# Patient Record
Sex: FEMALE | Race: WHITE | NOT HISPANIC OR LATINO | URBAN - METROPOLITAN AREA
[De-identification: names, ages, dates, MRNs, and addresses within clinical notes are randomized per-mention and may not be internally consistent; named-entity substitution may affect disease eponyms.]

---

## 2019-11-21 ENCOUNTER — OUTPATIENT (OUTPATIENT)
Dept: OUTPATIENT SERVICES | Facility: HOSPITAL | Age: 79
LOS: 1 days | Discharge: PSYCHIATRIC OR SUBSTANCE USE FACILITY | End: 2019-11-21
Payer: COMMERCIAL

## 2019-11-21 ENCOUNTER — EMERGENCY (EMERGENCY)
Facility: HOSPITAL | Age: 79
LOS: 1 days | Discharge: HOME | End: 2019-11-21
Attending: EMERGENCY MEDICINE | Admitting: EMERGENCY MEDICINE
Payer: COMMERCIAL

## 2019-11-21 VITALS
TEMPERATURE: 98 F | RESPIRATION RATE: 18 BRPM | OXYGEN SATURATION: 95 % | DIASTOLIC BLOOD PRESSURE: 77 MMHG | SYSTOLIC BLOOD PRESSURE: 167 MMHG | HEART RATE: 75 BPM

## 2019-11-21 VITALS
HEART RATE: 73 BPM | OXYGEN SATURATION: 100 % | DIASTOLIC BLOOD PRESSURE: 101 MMHG | TEMPERATURE: 98 F | SYSTOLIC BLOOD PRESSURE: 174 MMHG | RESPIRATION RATE: 16 BRPM

## 2019-11-21 PROCEDURE — 99283 EMERGENCY DEPT VISIT LOW MDM: CPT

## 2019-11-21 NOTE — ED PROVIDER NOTE - CLINICAL SUMMARY MEDICAL DECISION MAKING FREE TEXT BOX
Depression and anxiety.  no indication for inpatient psych hospitalization.  will refer pt to [facility] where she can walk in and be seen today.

## 2019-11-21 NOTE — ED ADULT NURSE NOTE - CHIEF COMPLAINT QUOTE
Pt c/o increasing depression , shaking and anxiety   x 1 week.   Denies SI, HI, hallucinations.  Pt was recently treated for PNA ,  c/o weakness , dizziness poor appetite at triage

## 2019-11-21 NOTE — ED ADULT TRIAGE NOTE - CHIEF COMPLAINT QUOTE
Pt c/o increasing depression , shaking and anxiety   x 1 week.   Denies SI, HI, hallucinations Pt c/o increasing depression , shaking and anxiety   x 1 week.   Denies SI, HI, hallucinations.  Pt was recently treated for PNA ,  c/o weakness , dizziness poor appetite at triage

## 2019-11-21 NOTE — ED PROVIDER NOTE - PROGRESS NOTE DETAILS
Claudine Ricardo MD: spoke to pt about going to the crisis center. pt is ok with going right now Claudine Ricardo MD: spoke to pt about going to the crisis center. pt is ok with going right now  Elaina:  discussed case with psych attending . this is an appropriate referral to the walk in crisis center at [facility] where they can write prescriptions for pt's

## 2019-11-21 NOTE — ED ADULT NURSE NOTE - NSIMPLEMENTINTERV_GEN_ALL_ED
Implemented All Universal Safety Interventions:  [place] to call system. Call bell, personal items and telephone within reach. Instruct patient to call for assistance. Room bathroom lighting operational. Non-slip footwear when patient is off stretcher. Physically safe environment: no spills, clutter or unnecessary equipment. Stretcher in lowest position, wheels locked, appropriate side rails in place.

## 2019-11-21 NOTE — ED PROVIDER NOTE - NSFOLLOWUPINSTRUCTIONS_ED_ALL_ED_FT
1) Please follow-up with your primary care doctor in the next 2-3 days.  Please call tomorrow for an appointment.  If you cannot follow-up with your primary care doctor please return to the ED for any urgent issues.  2) You were given a copy of the tests performed today.  Please bring the results with you and review them with your primary care doctor.  3) If you have any worsening of symptoms or any other concerns please return to the ED immediately. Such as but not limited to suicidal or homicidal thoughts or plans  4) Please continue taking your home medications as directed.

## 2019-11-21 NOTE — ED PROVIDER NOTE - OBJECTIVE STATEMENT
Attendinyo female presents with family for anxiety and depression.  pt states she was recently hospitalized for pneumonia and does not have any residual symptoms from that.  states she feels anxious.  when she was in the hospital for about 5 days, she was receiving xanax for anxiety, and she would like an Rx for that.  she made an appointment with a psychiatrist but it is not for a month.  pt denies suicidal ideation.  states she just wants a prescription for something to calm her nerves.

## 2019-11-21 NOTE — ED PROVIDER NOTE - PATIENT PORTAL LINK FT
You can access the FollowMyHealth Patient Portal offered by [facility] by registering at the following website: [email or web address]. By joining [facility]’s FollowMyHealth portal, you will also be able to view your health information using other applications (apps) compatible with our system.

## 2019-11-21 NOTE — ED ADULT NURSE NOTE - OBJECTIVE STATEMENT
Pt is a 78yr old female, A&Ox4 and ambulatory, complaining of anxiety, shakiness, and weakness the past few months. Pt was discharged from [facility] on Tuesday and was treated for pneumonia. As per brother, pt was given Xanex in the hospital for the anxiety and was "doing well on it". Pt reports never been diagnosed with anxiety or depression. Pt lives alone. Pt expresses her stressors are old age, financial status, and having her family live far away. Pt appears anxious, shaking hands noted. Pt reports having loss of appetite, feeling fatigued, and hopeless. Denies SI/HI and any type of hallucinations. Denies chest pain, SOB, headache, dizziness, and fever/chills. VS as noted. Will continue to monitor.

## 2019-11-22 DIAGNOSIS — F41.9 ANXIETY DISORDER, UNSPECIFIED: ICD-10-CM

## 2019-12-03 ENCOUNTER — INPATIENT (INPATIENT)
Facility: HOSPITAL | Age: 79
LOS: 89 days | Discharge: HOME | End: 2020-03-02
Attending: PSYCHIATRY & NEUROLOGY | Admitting: PSYCHIATRY & NEUROLOGY
Payer: COMMERCIAL

## 2019-12-03 VITALS — TEMPERATURE: 98 F | HEIGHT: 63 IN | WEIGHT: 134.04 LBS

## 2019-12-03 DIAGNOSIS — F41.9 ANXIETY DISORDER, UNSPECIFIED: ICD-10-CM

## 2019-12-03 PROBLEM — I10 ESSENTIAL (PRIMARY) HYPERTENSION: Chronic | Status: ACTIVE | Noted: 2019-11-23

## 2019-12-03 RX ORDER — SERTRALINE 25 MG/1
25 TABLET, FILM COATED ORAL DAILY
Refills: 0 | Status: DISCONTINUED | OUTPATIENT
Start: 2019-12-03 | End: 2019-12-06

## 2019-12-03 RX ORDER — ATORVASTATIN CALCIUM 80 MG/1
20 TABLET, FILM COATED ORAL AT BEDTIME
Refills: 0 | Status: DISCONTINUED | OUTPATIENT
Start: 2019-12-03 | End: 2020-03-02

## 2019-12-03 RX ORDER — METOPROLOL TARTRATE 50 MG
50 TABLET ORAL DAILY
Refills: 0 | Status: DISCONTINUED | OUTPATIENT
Start: 2019-12-03 | End: 2020-02-18

## 2019-12-03 RX ORDER — LISINOPRIL 2.5 MG/1
2.5 TABLET ORAL DAILY
Refills: 0 | Status: DISCONTINUED | OUTPATIENT
Start: 2019-12-03 | End: 2020-02-18

## 2019-12-03 RX ORDER — PANTOPRAZOLE SODIUM 20 MG/1
40 TABLET, DELAYED RELEASE ORAL
Refills: 0 | Status: DISCONTINUED | OUTPATIENT
Start: 2019-12-03 | End: 2019-12-05

## 2019-12-03 RX ORDER — LANOLIN ALCOHOL/MO/W.PET/CERES
3 CREAM (GRAM) TOPICAL AT BEDTIME
Refills: 0 | Status: DISCONTINUED | OUTPATIENT
Start: 2019-12-03 | End: 2020-03-02

## 2019-12-03 RX ADMIN — Medication 3 MILLIGRAM(S): at 20:51

## 2019-12-03 RX ADMIN — ATORVASTATIN CALCIUM 20 MILLIGRAM(S): 80 TABLET, FILM COATED ORAL at 20:51

## 2019-12-03 RX ADMIN — Medication 0.5 MILLIGRAM(S): at 20:51

## 2019-12-03 NOTE — CHART NOTE - NSCHARTNOTEFT_GEN_A_CORE
Screening Medical Evaluation  Patient Admitted from: Crisis Center    [facility] admitting diagnosis: Anxiety disorder    PAST MEDICAL & SURGICAL HISTORY:  HTN (hypertension)        Allergies    No Known Allergies    Intolerances        Social History:     FAMILY HISTORY:      MEDICATIONS  (STANDING):  atorvastatin 20 milliGRAM(s) Oral at bedtime  lisinopril 2.5 milliGRAM(s) Oral daily  LORazepam     Tablet 0.5 milliGRAM(s) Oral two times a day  melatonin. 3 milliGRAM(s) Oral at bedtime  metoprolol succinate ER 50 milliGRAM(s) Oral daily  pantoprazole    Tablet 40 milliGRAM(s) Oral before breakfast  PARoxetine 10 milliGRAM(s) Oral daily  sertraline 25 milliGRAM(s) Oral daily    MEDICATIONS  (PRN):  LORazepam     Tablet 0.5 milliGRAM(s) Oral every 6 hours PRN Anxiety      Vital Signs Last 24 Hrs  T(C): 36.4 (03 Dec 2019 16:19), Max: 36.4 (03 Dec 2019 16:19)  T(F): 97.5 (03 Dec 2019 16:19), Max: 97.5 (03 Dec 2019 16:19)  HR: 112  BP: 133/75  RR: 18  SpO2: 99  CAPILLARY BLOOD GLUCOSE            PHYSICAL EXAM:  GENERAL: NAD, well-developed  HEAD:  Atraumatic, Normocephalic  EYES: EOMI, PERRLA, conjunctiva and sclera clear  NECK: Supple.  CHEST/LUNG: Clear to auscultation bilaterally; No wheeze  HEART: Regular rate and rhythm; No murmurs, rubs, or gallops  ABDOMEN: Soft, Nontender, Nondistended; Bowel sounds present  EXTREMITIES:  2+ Peripheral Pulses, No cyanosis, or edema  PSYCH: AAOx3  NEUROLOGY: non-focal  SKIN: No rashes or lesions    LABS:                    RADIOLOGY & ADDITIONAL TESTS:    Assessment and Plan:  79 year old female presenting today from Crisis Center to [facility] with admitting diagnosis of Anxiety disorder and PMH of HTN, HLD, GERD. Denies any medical concerns at this time. Denies any fever, chills, headache, chest pain, SOB, abdominal pain, N/V/D/C, dysuria. Physical exam unremarkable.  1) Anxiety disorder: follow care plan as per primary team.   2) HTN: Continue Lisinopril 2.5mg oral daily, metoprolol 50mg oral daily  3) GERD: Continue Protonix DR 40mg oral before breakfast.  4) HLD: Continue Lipitor 20mg oral at bedtime.   5) Insomnia: Continue melatonin 3mg prn.

## 2019-12-04 LAB
ALBUMIN SERPL ELPH-MCNC: 3.4 G/DL — SIGNIFICANT CHANGE UP (ref 3.3–5)
ALP SERPL-CCNC: 60 U/L — SIGNIFICANT CHANGE UP (ref 40–120)
ALT FLD-CCNC: 11 U/L — SIGNIFICANT CHANGE UP (ref 4–33)
ANION GAP SERPL CALC-SCNC: 9 MMO/L — SIGNIFICANT CHANGE UP (ref 7–14)
AST SERPL-CCNC: 17 U/L — SIGNIFICANT CHANGE UP (ref 4–32)
BASOPHILS # BLD AUTO: 0.05 K/UL — SIGNIFICANT CHANGE UP (ref 0–0.2)
BASOPHILS NFR BLD AUTO: 1.2 % — SIGNIFICANT CHANGE UP (ref 0–2)
BILIRUB SERPL-MCNC: 0.5 MG/DL — SIGNIFICANT CHANGE UP (ref 0.2–1.2)
BUN SERPL-MCNC: 15 MG/DL — SIGNIFICANT CHANGE UP (ref 7–23)
CALCIUM SERPL-MCNC: 9.8 MG/DL — SIGNIFICANT CHANGE UP (ref 8.4–10.5)
CHLORIDE SERPL-SCNC: 104 MMOL/L — SIGNIFICANT CHANGE UP (ref 98–107)
CHOLEST SERPL-MCNC: 152 MG/DL — SIGNIFICANT CHANGE UP (ref 120–199)
CO2 SERPL-SCNC: 27 MMOL/L — SIGNIFICANT CHANGE UP (ref 22–31)
CREAT SERPL-MCNC: 0.73 MG/DL — SIGNIFICANT CHANGE UP (ref 0.5–1.3)
EOSINOPHIL # BLD AUTO: 0.17 K/UL — SIGNIFICANT CHANGE UP (ref 0–0.5)
EOSINOPHIL NFR BLD AUTO: 4 % — SIGNIFICANT CHANGE UP (ref 0–6)
GLUCOSE SERPL-MCNC: 95 MG/DL — SIGNIFICANT CHANGE UP (ref 70–99)
HCT VFR BLD CALC: 40.3 % — SIGNIFICANT CHANGE UP (ref 34.5–45)
HDLC SERPL-MCNC: 51 MG/DL — SIGNIFICANT CHANGE UP (ref 45–65)
HGB BLD-MCNC: 13.2 G/DL — SIGNIFICANT CHANGE UP (ref 11.5–15.5)
IMM GRANULOCYTES NFR BLD AUTO: 0.2 % — SIGNIFICANT CHANGE UP (ref 0–1.5)
LIPID PNL WITH DIRECT LDL SERPL: 87 MG/DL — SIGNIFICANT CHANGE UP
LYMPHOCYTES # BLD AUTO: 1.3 K/UL — SIGNIFICANT CHANGE UP (ref 1–3.3)
LYMPHOCYTES # BLD AUTO: 30.7 % — SIGNIFICANT CHANGE UP (ref 13–44)
MCHC RBC-ENTMCNC: 30.3 PG — SIGNIFICANT CHANGE UP (ref 27–34)
MCHC RBC-ENTMCNC: 32.8 % — SIGNIFICANT CHANGE UP (ref 32–36)
MCV RBC AUTO: 92.6 FL — SIGNIFICANT CHANGE UP (ref 80–100)
MONOCYTES # BLD AUTO: 0.49 K/UL — SIGNIFICANT CHANGE UP (ref 0–0.9)
MONOCYTES NFR BLD AUTO: 11.6 % — SIGNIFICANT CHANGE UP (ref 2–14)
NEUTROPHILS # BLD AUTO: 2.21 K/UL — SIGNIFICANT CHANGE UP (ref 1.8–7.4)
NEUTROPHILS NFR BLD AUTO: 52.3 % — SIGNIFICANT CHANGE UP (ref 43–77)
NRBC # FLD: 0 K/UL — SIGNIFICANT CHANGE UP (ref 0–0)
PLATELET # BLD AUTO: 296 K/UL — SIGNIFICANT CHANGE UP (ref 150–400)
PMV BLD: 8.9 FL — SIGNIFICANT CHANGE UP (ref 7–13)
POTASSIUM SERPL-MCNC: 4 MMOL/L — SIGNIFICANT CHANGE UP (ref 3.5–5.3)
POTASSIUM SERPL-SCNC: 4 MMOL/L — SIGNIFICANT CHANGE UP (ref 3.5–5.3)
PROT SERPL-MCNC: 6.3 G/DL — SIGNIFICANT CHANGE UP (ref 6–8.3)
RBC # BLD: 4.35 M/UL — SIGNIFICANT CHANGE UP (ref 3.8–5.2)
RBC # FLD: 13.3 % — SIGNIFICANT CHANGE UP (ref 10.3–14.5)
SODIUM SERPL-SCNC: 140 MMOL/L — SIGNIFICANT CHANGE UP (ref 135–145)
TRIGL SERPL-MCNC: 94 MG/DL — SIGNIFICANT CHANGE UP (ref 10–149)
TSH SERPL-MCNC: 1.01 UIU/ML — SIGNIFICANT CHANGE UP (ref 0.27–4.2)
WBC # BLD: 4.23 K/UL — SIGNIFICANT CHANGE UP (ref 3.8–10.5)
WBC # FLD AUTO: 4.23 K/UL — SIGNIFICANT CHANGE UP (ref 3.8–10.5)

## 2019-12-04 PROCEDURE — 99232 SBSQ HOSP IP/OBS MODERATE 35: CPT | Mod: GC

## 2019-12-04 PROCEDURE — 90853 GROUP PSYCHOTHERAPY: CPT

## 2019-12-04 RX ADMIN — Medication 3 MILLIGRAM(S): at 20:55

## 2019-12-04 RX ADMIN — Medication 0.5 MILLIGRAM(S): at 08:27

## 2019-12-04 RX ADMIN — PANTOPRAZOLE SODIUM 40 MILLIGRAM(S): 20 TABLET, DELAYED RELEASE ORAL at 08:27

## 2019-12-04 RX ADMIN — Medication 0.5 MILLIGRAM(S): at 20:55

## 2019-12-04 RX ADMIN — ATORVASTATIN CALCIUM 20 MILLIGRAM(S): 80 TABLET, FILM COATED ORAL at 20:55

## 2019-12-04 RX ADMIN — Medication 10 MILLIGRAM(S): at 08:27

## 2019-12-04 RX ADMIN — SERTRALINE 25 MILLIGRAM(S): 25 TABLET, FILM COATED ORAL at 08:27

## 2019-12-05 PROCEDURE — 99232 SBSQ HOSP IP/OBS MODERATE 35: CPT | Mod: GC

## 2019-12-05 RX ORDER — ACETAMINOPHEN 500 MG
500 TABLET ORAL EVERY 6 HOURS
Refills: 0 | Status: DISCONTINUED | OUTPATIENT
Start: 2019-12-05 | End: 2019-12-05

## 2019-12-05 RX ORDER — ACETAMINOPHEN 500 MG
650 TABLET ORAL EVERY 6 HOURS
Refills: 0 | Status: DISCONTINUED | OUTPATIENT
Start: 2019-12-05 | End: 2020-03-02

## 2019-12-05 RX ORDER — ESCITALOPRAM OXALATE 10 MG/1
5 TABLET, FILM COATED ORAL DAILY
Refills: 0 | Status: DISCONTINUED | OUTPATIENT
Start: 2019-12-05 | End: 2019-12-05

## 2019-12-05 RX ADMIN — PANTOPRAZOLE SODIUM 40 MILLIGRAM(S): 20 TABLET, DELAYED RELEASE ORAL at 07:57

## 2019-12-05 RX ADMIN — Medication 0.5 MILLIGRAM(S): at 20:20

## 2019-12-05 RX ADMIN — Medication 10 MILLIGRAM(S): at 09:30

## 2019-12-05 RX ADMIN — Medication 3 MILLIGRAM(S): at 20:32

## 2019-12-05 RX ADMIN — ATORVASTATIN CALCIUM 20 MILLIGRAM(S): 80 TABLET, FILM COATED ORAL at 20:32

## 2019-12-05 RX ADMIN — Medication 0.5 MILLIGRAM(S): at 09:30

## 2019-12-05 RX ADMIN — Medication 50 MILLIGRAM(S): at 09:30

## 2019-12-05 RX ADMIN — SERTRALINE 25 MILLIGRAM(S): 25 TABLET, FILM COATED ORAL at 09:30

## 2019-12-05 RX ADMIN — Medication 0.5 MILLIGRAM(S): at 12:55

## 2019-12-05 RX ADMIN — LISINOPRIL 2.5 MILLIGRAM(S): 2.5 TABLET ORAL at 09:30

## 2019-12-05 RX ADMIN — Medication 650 MILLIGRAM(S): at 10:40

## 2019-12-05 RX ADMIN — Medication 650 MILLIGRAM(S): at 09:40

## 2019-12-05 NOTE — PHARMACOTHERAPY INTERVENTION NOTE - COMMENTS
Spoke to Dr. Prather recommending to change Acetaminophen 500 mG to 325 mG (2) tablets q6h prn.  Md accepted recommendation.

## 2019-12-06 PROCEDURE — 99232 SBSQ HOSP IP/OBS MODERATE 35: CPT

## 2019-12-06 RX ORDER — SERTRALINE 25 MG/1
50 TABLET, FILM COATED ORAL DAILY
Refills: 0 | Status: DISCONTINUED | OUTPATIENT
Start: 2019-12-06 | End: 2019-12-08

## 2019-12-06 RX ADMIN — LISINOPRIL 2.5 MILLIGRAM(S): 2.5 TABLET ORAL at 09:32

## 2019-12-06 RX ADMIN — Medication 0.5 MILLIGRAM(S): at 09:32

## 2019-12-06 RX ADMIN — Medication 0.5 MILLIGRAM(S): at 13:33

## 2019-12-06 RX ADMIN — SERTRALINE 50 MILLIGRAM(S): 25 TABLET, FILM COATED ORAL at 09:32

## 2019-12-06 RX ADMIN — Medication 0.5 MILLIGRAM(S): at 21:07

## 2019-12-06 RX ADMIN — Medication 50 MILLIGRAM(S): at 09:32

## 2019-12-06 RX ADMIN — Medication 3 MILLIGRAM(S): at 21:07

## 2019-12-06 RX ADMIN — Medication 1 TABLET(S): at 09:32

## 2019-12-06 RX ADMIN — ATORVASTATIN CALCIUM 20 MILLIGRAM(S): 80 TABLET, FILM COATED ORAL at 21:07

## 2019-12-06 RX ADMIN — Medication 650 MILLIGRAM(S): at 18:15

## 2019-12-06 RX ADMIN — Medication 650 MILLIGRAM(S): at 17:30

## 2019-12-07 PROCEDURE — 99231 SBSQ HOSP IP/OBS SF/LOW 25: CPT

## 2019-12-07 RX ORDER — POLYETHYLENE GLYCOL 3350 17 G/17G
17 POWDER, FOR SOLUTION ORAL DAILY
Refills: 0 | Status: DISCONTINUED | OUTPATIENT
Start: 2019-12-07 | End: 2019-12-10

## 2019-12-07 RX ADMIN — Medication 0.5 MILLIGRAM(S): at 11:43

## 2019-12-07 RX ADMIN — SERTRALINE 50 MILLIGRAM(S): 25 TABLET, FILM COATED ORAL at 09:06

## 2019-12-07 RX ADMIN — Medication 1 TABLET(S): at 09:06

## 2019-12-07 RX ADMIN — Medication 650 MILLIGRAM(S): at 08:43

## 2019-12-07 RX ADMIN — Medication 50 MILLIGRAM(S): at 09:06

## 2019-12-07 RX ADMIN — Medication 0.5 MILLIGRAM(S): at 13:42

## 2019-12-07 RX ADMIN — Medication 3 MILLIGRAM(S): at 21:24

## 2019-12-07 RX ADMIN — Medication 0.5 MILLIGRAM(S): at 08:55

## 2019-12-07 RX ADMIN — LISINOPRIL 2.5 MILLIGRAM(S): 2.5 TABLET ORAL at 09:06

## 2019-12-07 RX ADMIN — ATORVASTATIN CALCIUM 20 MILLIGRAM(S): 80 TABLET, FILM COATED ORAL at 21:24

## 2019-12-07 RX ADMIN — Medication 650 MILLIGRAM(S): at 07:46

## 2019-12-07 RX ADMIN — POLYETHYLENE GLYCOL 3350 17 GRAM(S): 17 POWDER, FOR SOLUTION ORAL at 09:06

## 2019-12-07 RX ADMIN — Medication 0.5 MILLIGRAM(S): at 21:24

## 2019-12-08 PROCEDURE — 99231 SBSQ HOSP IP/OBS SF/LOW 25: CPT

## 2019-12-08 RX ORDER — SENNA PLUS 8.6 MG/1
2 TABLET ORAL AT BEDTIME
Refills: 0 | Status: DISCONTINUED | OUTPATIENT
Start: 2019-12-08 | End: 2019-12-10

## 2019-12-08 RX ORDER — SERTRALINE 25 MG/1
100 TABLET, FILM COATED ORAL DAILY
Refills: 0 | Status: DISCONTINUED | OUTPATIENT
Start: 2019-12-08 | End: 2020-01-06

## 2019-12-08 RX ADMIN — Medication 3 MILLIGRAM(S): at 21:15

## 2019-12-08 RX ADMIN — Medication 0.5 MILLIGRAM(S): at 21:13

## 2019-12-08 RX ADMIN — SENNA PLUS 2 TABLET(S): 8.6 TABLET ORAL at 21:15

## 2019-12-08 RX ADMIN — Medication 0.5 MILLIGRAM(S): at 09:56

## 2019-12-08 RX ADMIN — Medication 650 MILLIGRAM(S): at 10:28

## 2019-12-08 RX ADMIN — ATORVASTATIN CALCIUM 20 MILLIGRAM(S): 80 TABLET, FILM COATED ORAL at 21:15

## 2019-12-08 RX ADMIN — POLYETHYLENE GLYCOL 3350 17 GRAM(S): 17 POWDER, FOR SOLUTION ORAL at 09:56

## 2019-12-08 RX ADMIN — Medication 0.5 MILLIGRAM(S): at 12:48

## 2019-12-08 RX ADMIN — SERTRALINE 100 MILLIGRAM(S): 25 TABLET, FILM COATED ORAL at 09:56

## 2019-12-08 RX ADMIN — Medication 1 TABLET(S): at 09:56

## 2019-12-08 RX ADMIN — Medication 650 MILLIGRAM(S): at 11:30

## 2019-12-09 PROCEDURE — 99232 SBSQ HOSP IP/OBS MODERATE 35: CPT | Mod: GC

## 2019-12-09 RX ADMIN — Medication 50 MILLIGRAM(S): at 08:40

## 2019-12-09 RX ADMIN — Medication 3 MILLIGRAM(S): at 20:51

## 2019-12-09 RX ADMIN — Medication 1 ENEMA: at 12:36

## 2019-12-09 RX ADMIN — ATORVASTATIN CALCIUM 20 MILLIGRAM(S): 80 TABLET, FILM COATED ORAL at 20:51

## 2019-12-09 RX ADMIN — Medication 0.5 MILLIGRAM(S): at 08:40

## 2019-12-09 RX ADMIN — Medication 0.5 MILLIGRAM(S): at 20:50

## 2019-12-09 RX ADMIN — SERTRALINE 100 MILLIGRAM(S): 25 TABLET, FILM COATED ORAL at 08:40

## 2019-12-09 RX ADMIN — POLYETHYLENE GLYCOL 3350 17 GRAM(S): 17 POWDER, FOR SOLUTION ORAL at 08:40

## 2019-12-09 RX ADMIN — LISINOPRIL 2.5 MILLIGRAM(S): 2.5 TABLET ORAL at 08:40

## 2019-12-09 RX ADMIN — Medication 0.5 MILLIGRAM(S): at 12:43

## 2019-12-09 RX ADMIN — SENNA PLUS 2 TABLET(S): 8.6 TABLET ORAL at 20:51

## 2019-12-09 RX ADMIN — Medication 1 TABLET(S): at 08:40

## 2019-12-10 LAB
FOLATE SERPL-MCNC: > 20 NG/ML — HIGH (ref 4.7–20)
HBA1C BLD-MCNC: 5.4 % — SIGNIFICANT CHANGE UP (ref 4–5.6)
VIT B12 SERPL-MCNC: 640 PG/ML — SIGNIFICANT CHANGE UP (ref 200–900)

## 2019-12-10 PROCEDURE — 99232 SBSQ HOSP IP/OBS MODERATE 35: CPT | Mod: GC

## 2019-12-10 RX ORDER — SENNA PLUS 8.6 MG/1
2 TABLET ORAL AT BEDTIME
Refills: 0 | Status: DISCONTINUED | OUTPATIENT
Start: 2019-12-10 | End: 2020-03-02

## 2019-12-10 RX ORDER — POLYETHYLENE GLYCOL 3350 17 G/17G
17 POWDER, FOR SOLUTION ORAL DAILY
Refills: 0 | Status: DISCONTINUED | OUTPATIENT
Start: 2019-12-10 | End: 2020-03-02

## 2019-12-10 RX ADMIN — Medication 0.5 MILLIGRAM(S): at 15:30

## 2019-12-10 RX ADMIN — ATORVASTATIN CALCIUM 20 MILLIGRAM(S): 80 TABLET, FILM COATED ORAL at 20:13

## 2019-12-10 RX ADMIN — Medication 650 MILLIGRAM(S): at 20:12

## 2019-12-10 RX ADMIN — Medication 50 MILLIGRAM(S): at 08:22

## 2019-12-10 RX ADMIN — LISINOPRIL 2.5 MILLIGRAM(S): 2.5 TABLET ORAL at 08:22

## 2019-12-10 RX ADMIN — Medication 0.5 MILLIGRAM(S): at 20:12

## 2019-12-10 RX ADMIN — Medication 1 TABLET(S): at 08:22

## 2019-12-10 RX ADMIN — SERTRALINE 100 MILLIGRAM(S): 25 TABLET, FILM COATED ORAL at 08:21

## 2019-12-10 RX ADMIN — Medication 3 MILLIGRAM(S): at 20:13

## 2019-12-10 RX ADMIN — Medication 650 MILLIGRAM(S): at 21:38

## 2019-12-10 RX ADMIN — Medication 0.5 MILLIGRAM(S): at 08:22

## 2019-12-11 PROCEDURE — 99232 SBSQ HOSP IP/OBS MODERATE 35: CPT | Mod: GC

## 2019-12-11 RX ORDER — VENLAFAXINE HCL 75 MG
37.5 CAPSULE, EXT RELEASE 24 HR ORAL DAILY
Refills: 0 | Status: DISCONTINUED | OUTPATIENT
Start: 2019-12-12 | End: 2019-12-14

## 2019-12-11 RX ADMIN — LISINOPRIL 2.5 MILLIGRAM(S): 2.5 TABLET ORAL at 08:00

## 2019-12-11 RX ADMIN — Medication 1 TABLET(S): at 08:00

## 2019-12-11 RX ADMIN — Medication 0.5 MILLIGRAM(S): at 21:09

## 2019-12-11 RX ADMIN — Medication 0.5 MILLIGRAM(S): at 08:00

## 2019-12-11 RX ADMIN — SERTRALINE 100 MILLIGRAM(S): 25 TABLET, FILM COATED ORAL at 08:00

## 2019-12-11 RX ADMIN — Medication 3 MILLIGRAM(S): at 21:09

## 2019-12-11 RX ADMIN — Medication 50 MILLIGRAM(S): at 08:00

## 2019-12-11 RX ADMIN — ATORVASTATIN CALCIUM 20 MILLIGRAM(S): 80 TABLET, FILM COATED ORAL at 21:09

## 2019-12-12 PROCEDURE — 99232 SBSQ HOSP IP/OBS MODERATE 35: CPT | Mod: GC

## 2019-12-12 RX ADMIN — LISINOPRIL 2.5 MILLIGRAM(S): 2.5 TABLET ORAL at 09:08

## 2019-12-12 RX ADMIN — Medication 0.5 MILLIGRAM(S): at 14:40

## 2019-12-12 RX ADMIN — Medication 50 MILLIGRAM(S): at 09:08

## 2019-12-12 RX ADMIN — Medication 1 TABLET(S): at 09:08

## 2019-12-12 RX ADMIN — ATORVASTATIN CALCIUM 20 MILLIGRAM(S): 80 TABLET, FILM COATED ORAL at 20:47

## 2019-12-12 RX ADMIN — Medication 37.5 MILLIGRAM(S): at 09:08

## 2019-12-12 RX ADMIN — Medication 0.5 MILLIGRAM(S): at 20:47

## 2019-12-12 RX ADMIN — Medication 3 MILLIGRAM(S): at 20:47

## 2019-12-12 RX ADMIN — Medication 0.5 MILLIGRAM(S): at 09:08

## 2019-12-12 RX ADMIN — SERTRALINE 100 MILLIGRAM(S): 25 TABLET, FILM COATED ORAL at 09:08

## 2019-12-13 PROCEDURE — 99232 SBSQ HOSP IP/OBS MODERATE 35: CPT | Mod: GC

## 2019-12-13 RX ADMIN — Medication 3 MILLIGRAM(S): at 20:22

## 2019-12-13 RX ADMIN — ATORVASTATIN CALCIUM 20 MILLIGRAM(S): 80 TABLET, FILM COATED ORAL at 20:22

## 2019-12-13 RX ADMIN — Medication 37.5 MILLIGRAM(S): at 09:20

## 2019-12-13 RX ADMIN — Medication 1 TABLET(S): at 09:20

## 2019-12-13 RX ADMIN — SERTRALINE 100 MILLIGRAM(S): 25 TABLET, FILM COATED ORAL at 09:20

## 2019-12-13 RX ADMIN — Medication 0.5 MILLIGRAM(S): at 20:22

## 2019-12-13 RX ADMIN — LISINOPRIL 2.5 MILLIGRAM(S): 2.5 TABLET ORAL at 09:20

## 2019-12-13 RX ADMIN — Medication 0.5 MILLIGRAM(S): at 09:20

## 2019-12-13 RX ADMIN — Medication 0.5 MILLIGRAM(S): at 16:03

## 2019-12-13 RX ADMIN — Medication 50 MILLIGRAM(S): at 09:20

## 2019-12-14 PROCEDURE — 99231 SBSQ HOSP IP/OBS SF/LOW 25: CPT

## 2019-12-14 RX ORDER — VENLAFAXINE HCL 75 MG
75 CAPSULE, EXT RELEASE 24 HR ORAL DAILY
Refills: 0 | Status: DISCONTINUED | OUTPATIENT
Start: 2019-12-15 | End: 2019-12-18

## 2019-12-14 RX ADMIN — Medication 37.5 MILLIGRAM(S): at 08:22

## 2019-12-14 RX ADMIN — Medication 0.5 MILLIGRAM(S): at 08:22

## 2019-12-14 RX ADMIN — Medication 1 TABLET(S): at 08:22

## 2019-12-14 RX ADMIN — Medication 3 MILLIGRAM(S): at 21:06

## 2019-12-14 RX ADMIN — Medication 650 MILLIGRAM(S): at 11:54

## 2019-12-14 RX ADMIN — ATORVASTATIN CALCIUM 20 MILLIGRAM(S): 80 TABLET, FILM COATED ORAL at 21:06

## 2019-12-14 RX ADMIN — SERTRALINE 100 MILLIGRAM(S): 25 TABLET, FILM COATED ORAL at 08:22

## 2019-12-14 RX ADMIN — LISINOPRIL 2.5 MILLIGRAM(S): 2.5 TABLET ORAL at 08:22

## 2019-12-14 RX ADMIN — Medication 50 MILLIGRAM(S): at 08:22

## 2019-12-14 RX ADMIN — Medication 650 MILLIGRAM(S): at 10:54

## 2019-12-14 RX ADMIN — Medication 0.5 MILLIGRAM(S): at 21:06

## 2019-12-15 PROCEDURE — 99231 SBSQ HOSP IP/OBS SF/LOW 25: CPT

## 2019-12-15 RX ADMIN — Medication 0.5 MILLIGRAM(S): at 08:16

## 2019-12-15 RX ADMIN — ATORVASTATIN CALCIUM 20 MILLIGRAM(S): 80 TABLET, FILM COATED ORAL at 20:45

## 2019-12-15 RX ADMIN — LISINOPRIL 2.5 MILLIGRAM(S): 2.5 TABLET ORAL at 08:18

## 2019-12-15 RX ADMIN — Medication 3 MILLIGRAM(S): at 20:45

## 2019-12-15 RX ADMIN — Medication 50 MILLIGRAM(S): at 08:18

## 2019-12-15 RX ADMIN — SERTRALINE 100 MILLIGRAM(S): 25 TABLET, FILM COATED ORAL at 08:18

## 2019-12-15 RX ADMIN — Medication 0.5 MILLIGRAM(S): at 15:57

## 2019-12-15 RX ADMIN — Medication 0.5 MILLIGRAM(S): at 20:45

## 2019-12-15 RX ADMIN — Medication 75 MILLIGRAM(S): at 08:18

## 2019-12-15 RX ADMIN — Medication 1 TABLET(S): at 08:18

## 2019-12-16 PROCEDURE — 99232 SBSQ HOSP IP/OBS MODERATE 35: CPT

## 2019-12-16 RX ADMIN — Medication 1 TABLET(S): at 08:03

## 2019-12-16 RX ADMIN — Medication 650 MILLIGRAM(S): at 13:00

## 2019-12-16 RX ADMIN — ATORVASTATIN CALCIUM 20 MILLIGRAM(S): 80 TABLET, FILM COATED ORAL at 20:14

## 2019-12-16 RX ADMIN — Medication 0.5 MILLIGRAM(S): at 20:14

## 2019-12-16 RX ADMIN — Medication 0.5 MILLIGRAM(S): at 08:03

## 2019-12-16 RX ADMIN — SERTRALINE 100 MILLIGRAM(S): 25 TABLET, FILM COATED ORAL at 08:03

## 2019-12-16 RX ADMIN — Medication 3 MILLIGRAM(S): at 20:14

## 2019-12-16 RX ADMIN — Medication 75 MILLIGRAM(S): at 08:03

## 2019-12-16 RX ADMIN — Medication 0.5 MILLIGRAM(S): at 12:37

## 2019-12-16 RX ADMIN — Medication 650 MILLIGRAM(S): at 12:37

## 2019-12-17 PROCEDURE — 99232 SBSQ HOSP IP/OBS MODERATE 35: CPT | Mod: GC

## 2019-12-17 RX ADMIN — SERTRALINE 100 MILLIGRAM(S): 25 TABLET, FILM COATED ORAL at 09:37

## 2019-12-17 RX ADMIN — Medication 0.5 MILLIGRAM(S): at 13:05

## 2019-12-17 RX ADMIN — ATORVASTATIN CALCIUM 20 MILLIGRAM(S): 80 TABLET, FILM COATED ORAL at 21:12

## 2019-12-17 RX ADMIN — Medication 1 TABLET(S): at 09:37

## 2019-12-17 RX ADMIN — Medication 3 MILLIGRAM(S): at 21:12

## 2019-12-17 RX ADMIN — Medication 650 MILLIGRAM(S): at 09:44

## 2019-12-17 RX ADMIN — Medication 0.5 MILLIGRAM(S): at 09:37

## 2019-12-17 RX ADMIN — Medication 75 MILLIGRAM(S): at 09:37

## 2019-12-17 RX ADMIN — Medication 0.5 MILLIGRAM(S): at 20:43

## 2019-12-17 RX ADMIN — Medication 650 MILLIGRAM(S): at 10:30

## 2019-12-18 PROCEDURE — 99232 SBSQ HOSP IP/OBS MODERATE 35: CPT | Mod: GC

## 2019-12-18 RX ORDER — VENLAFAXINE HCL 75 MG
112.5 CAPSULE, EXT RELEASE 24 HR ORAL DAILY
Refills: 0 | Status: DISCONTINUED | OUTPATIENT
Start: 2019-12-18 | End: 2019-12-24

## 2019-12-18 RX ADMIN — Medication 50 MILLIGRAM(S): at 08:17

## 2019-12-18 RX ADMIN — Medication 3 MILLIGRAM(S): at 21:07

## 2019-12-18 RX ADMIN — SERTRALINE 100 MILLIGRAM(S): 25 TABLET, FILM COATED ORAL at 08:17

## 2019-12-18 RX ADMIN — LISINOPRIL 2.5 MILLIGRAM(S): 2.5 TABLET ORAL at 08:17

## 2019-12-18 RX ADMIN — Medication 1 TABLET(S): at 08:17

## 2019-12-18 RX ADMIN — Medication 0.5 MILLIGRAM(S): at 15:39

## 2019-12-18 RX ADMIN — ATORVASTATIN CALCIUM 20 MILLIGRAM(S): 80 TABLET, FILM COATED ORAL at 21:07

## 2019-12-18 RX ADMIN — Medication 75 MILLIGRAM(S): at 08:17

## 2019-12-19 PROCEDURE — 99232 SBSQ HOSP IP/OBS MODERATE 35: CPT

## 2019-12-19 RX ADMIN — SERTRALINE 100 MILLIGRAM(S): 25 TABLET, FILM COATED ORAL at 08:53

## 2019-12-19 RX ADMIN — Medication 3 MILLIGRAM(S): at 21:04

## 2019-12-19 RX ADMIN — Medication 0.5 MILLIGRAM(S): at 12:05

## 2019-12-19 RX ADMIN — Medication 0.5 MILLIGRAM(S): at 20:20

## 2019-12-19 RX ADMIN — Medication 0.5 MILLIGRAM(S): at 09:38

## 2019-12-19 RX ADMIN — Medication 50 MILLIGRAM(S): at 08:53

## 2019-12-19 RX ADMIN — Medication 112.5 MILLIGRAM(S): at 08:53

## 2019-12-19 RX ADMIN — Medication 0.5 MILLIGRAM(S): at 18:09

## 2019-12-19 RX ADMIN — ATORVASTATIN CALCIUM 20 MILLIGRAM(S): 80 TABLET, FILM COATED ORAL at 21:04

## 2019-12-19 RX ADMIN — Medication 1 TABLET(S): at 08:53

## 2019-12-19 RX ADMIN — LISINOPRIL 2.5 MILLIGRAM(S): 2.5 TABLET ORAL at 08:53

## 2019-12-20 PROCEDURE — 99232 SBSQ HOSP IP/OBS MODERATE 35: CPT | Mod: GC

## 2019-12-20 RX ORDER — SODIUM CHLORIDE 0.65 %
1 AEROSOL, SPRAY (ML) NASAL EVERY 4 HOURS
Refills: 0 | Status: DISCONTINUED | OUTPATIENT
Start: 2019-12-20 | End: 2020-03-02

## 2019-12-20 RX ADMIN — SERTRALINE 100 MILLIGRAM(S): 25 TABLET, FILM COATED ORAL at 09:00

## 2019-12-20 RX ADMIN — Medication 0.5 MILLIGRAM(S): at 20:23

## 2019-12-20 RX ADMIN — LISINOPRIL 2.5 MILLIGRAM(S): 2.5 TABLET ORAL at 09:00

## 2019-12-20 RX ADMIN — Medication 50 MILLIGRAM(S): at 09:00

## 2019-12-20 RX ADMIN — Medication 0.5 MILLIGRAM(S): at 09:00

## 2019-12-20 RX ADMIN — ATORVASTATIN CALCIUM 20 MILLIGRAM(S): 80 TABLET, FILM COATED ORAL at 20:23

## 2019-12-20 RX ADMIN — Medication 112.5 MILLIGRAM(S): at 09:00

## 2019-12-20 RX ADMIN — Medication 3 MILLIGRAM(S): at 20:23

## 2019-12-20 RX ADMIN — Medication 1 TABLET(S): at 09:00

## 2019-12-20 RX ADMIN — Medication 0.5 MILLIGRAM(S): at 11:40

## 2019-12-21 RX ADMIN — Medication 112.5 MILLIGRAM(S): at 08:13

## 2019-12-21 RX ADMIN — SERTRALINE 100 MILLIGRAM(S): 25 TABLET, FILM COATED ORAL at 08:12

## 2019-12-21 RX ADMIN — LISINOPRIL 2.5 MILLIGRAM(S): 2.5 TABLET ORAL at 08:13

## 2019-12-21 RX ADMIN — Medication 50 MILLIGRAM(S): at 08:13

## 2019-12-21 RX ADMIN — Medication 3 MILLIGRAM(S): at 20:43

## 2019-12-21 RX ADMIN — Medication 1 TABLET(S): at 08:12

## 2019-12-21 RX ADMIN — Medication 0.5 MILLIGRAM(S): at 08:13

## 2019-12-21 RX ADMIN — Medication 0.5 MILLIGRAM(S): at 20:43

## 2019-12-21 RX ADMIN — Medication 0.5 MILLIGRAM(S): at 11:28

## 2019-12-21 RX ADMIN — ATORVASTATIN CALCIUM 20 MILLIGRAM(S): 80 TABLET, FILM COATED ORAL at 20:43

## 2019-12-22 RX ADMIN — ATORVASTATIN CALCIUM 20 MILLIGRAM(S): 80 TABLET, FILM COATED ORAL at 20:24

## 2019-12-22 RX ADMIN — Medication 1 TABLET(S): at 09:34

## 2019-12-22 RX ADMIN — Medication 650 MILLIGRAM(S): at 12:39

## 2019-12-22 RX ADMIN — Medication 0.5 MILLIGRAM(S): at 20:24

## 2019-12-22 RX ADMIN — Medication 0.5 MILLIGRAM(S): at 09:34

## 2019-12-22 RX ADMIN — SERTRALINE 100 MILLIGRAM(S): 25 TABLET, FILM COATED ORAL at 09:34

## 2019-12-22 RX ADMIN — Medication 650 MILLIGRAM(S): at 11:54

## 2019-12-22 RX ADMIN — LISINOPRIL 2.5 MILLIGRAM(S): 2.5 TABLET ORAL at 09:34

## 2019-12-22 RX ADMIN — Medication 0.5 MILLIGRAM(S): at 12:54

## 2019-12-22 RX ADMIN — Medication 50 MILLIGRAM(S): at 09:34

## 2019-12-22 RX ADMIN — Medication 112.5 MILLIGRAM(S): at 09:35

## 2019-12-22 RX ADMIN — Medication 3 MILLIGRAM(S): at 20:24

## 2019-12-23 PROCEDURE — 99232 SBSQ HOSP IP/OBS MODERATE 35: CPT | Mod: GC

## 2019-12-23 PROCEDURE — 90853 GROUP PSYCHOTHERAPY: CPT

## 2019-12-23 RX ADMIN — ATORVASTATIN CALCIUM 20 MILLIGRAM(S): 80 TABLET, FILM COATED ORAL at 20:18

## 2019-12-23 RX ADMIN — LISINOPRIL 2.5 MILLIGRAM(S): 2.5 TABLET ORAL at 08:54

## 2019-12-23 RX ADMIN — Medication 1 TABLET(S): at 08:54

## 2019-12-23 RX ADMIN — Medication 0.5 MILLIGRAM(S): at 20:18

## 2019-12-23 RX ADMIN — SERTRALINE 100 MILLIGRAM(S): 25 TABLET, FILM COATED ORAL at 08:54

## 2019-12-23 RX ADMIN — Medication 3 MILLIGRAM(S): at 20:18

## 2019-12-23 RX ADMIN — Medication 0.5 MILLIGRAM(S): at 08:54

## 2019-12-23 RX ADMIN — Medication 112.5 MILLIGRAM(S): at 08:54

## 2019-12-23 RX ADMIN — Medication 0.5 MILLIGRAM(S): at 12:07

## 2019-12-23 RX ADMIN — Medication 50 MILLIGRAM(S): at 08:54

## 2019-12-24 PROCEDURE — 99232 SBSQ HOSP IP/OBS MODERATE 35: CPT | Mod: GC

## 2019-12-24 RX ORDER — VENLAFAXINE HCL 75 MG
150 CAPSULE, EXT RELEASE 24 HR ORAL DAILY
Refills: 0 | Status: DISCONTINUED | OUTPATIENT
Start: 2019-12-24 | End: 2019-12-31

## 2019-12-24 RX ADMIN — Medication 0.5 MILLIGRAM(S): at 20:54

## 2019-12-24 RX ADMIN — Medication 0.5 MILLIGRAM(S): at 08:24

## 2019-12-24 RX ADMIN — SERTRALINE 100 MILLIGRAM(S): 25 TABLET, FILM COATED ORAL at 08:24

## 2019-12-24 RX ADMIN — Medication 3 MILLIGRAM(S): at 20:54

## 2019-12-24 RX ADMIN — LISINOPRIL 2.5 MILLIGRAM(S): 2.5 TABLET ORAL at 08:24

## 2019-12-24 RX ADMIN — ATORVASTATIN CALCIUM 20 MILLIGRAM(S): 80 TABLET, FILM COATED ORAL at 20:54

## 2019-12-24 RX ADMIN — Medication 50 MILLIGRAM(S): at 08:24

## 2019-12-24 RX ADMIN — Medication 150 MILLIGRAM(S): at 08:56

## 2019-12-24 RX ADMIN — Medication 0.5 MILLIGRAM(S): at 12:49

## 2019-12-24 RX ADMIN — Medication 1 TABLET(S): at 08:24

## 2019-12-25 RX ADMIN — Medication 50 MILLIGRAM(S): at 08:49

## 2019-12-25 RX ADMIN — Medication 0.5 MILLIGRAM(S): at 20:20

## 2019-12-25 RX ADMIN — Medication 0.5 MILLIGRAM(S): at 12:22

## 2019-12-25 RX ADMIN — LISINOPRIL 2.5 MILLIGRAM(S): 2.5 TABLET ORAL at 08:49

## 2019-12-25 RX ADMIN — ATORVASTATIN CALCIUM 20 MILLIGRAM(S): 80 TABLET, FILM COATED ORAL at 20:20

## 2019-12-25 RX ADMIN — Medication 0.5 MILLIGRAM(S): at 08:47

## 2019-12-25 RX ADMIN — Medication 150 MILLIGRAM(S): at 08:49

## 2019-12-25 RX ADMIN — Medication 3 MILLIGRAM(S): at 20:20

## 2019-12-25 RX ADMIN — Medication 1 TABLET(S): at 08:49

## 2019-12-25 RX ADMIN — SERTRALINE 100 MILLIGRAM(S): 25 TABLET, FILM COATED ORAL at 08:49

## 2019-12-26 PROCEDURE — 99232 SBSQ HOSP IP/OBS MODERATE 35: CPT

## 2019-12-26 RX ADMIN — Medication 3 MILLIGRAM(S): at 20:46

## 2019-12-26 RX ADMIN — Medication 0.5 MILLIGRAM(S): at 09:10

## 2019-12-26 RX ADMIN — Medication 50 MILLIGRAM(S): at 09:13

## 2019-12-26 RX ADMIN — ATORVASTATIN CALCIUM 20 MILLIGRAM(S): 80 TABLET, FILM COATED ORAL at 20:46

## 2019-12-26 RX ADMIN — SERTRALINE 100 MILLIGRAM(S): 25 TABLET, FILM COATED ORAL at 09:13

## 2019-12-26 RX ADMIN — Medication 0.5 MILLIGRAM(S): at 20:46

## 2019-12-26 RX ADMIN — Medication 0.5 MILLIGRAM(S): at 13:29

## 2019-12-26 RX ADMIN — Medication 150 MILLIGRAM(S): at 09:13

## 2019-12-26 RX ADMIN — Medication 1 TABLET(S): at 09:13

## 2019-12-26 RX ADMIN — LISINOPRIL 2.5 MILLIGRAM(S): 2.5 TABLET ORAL at 09:13

## 2019-12-27 PROCEDURE — 99232 SBSQ HOSP IP/OBS MODERATE 35: CPT

## 2019-12-27 RX ADMIN — Medication 50 MILLIGRAM(S): at 08:56

## 2019-12-27 RX ADMIN — Medication 3 MILLIGRAM(S): at 20:01

## 2019-12-27 RX ADMIN — Medication 0.5 MILLIGRAM(S): at 14:05

## 2019-12-27 RX ADMIN — SERTRALINE 100 MILLIGRAM(S): 25 TABLET, FILM COATED ORAL at 08:56

## 2019-12-27 RX ADMIN — Medication 1 TABLET(S): at 08:56

## 2019-12-27 RX ADMIN — Medication 0.5 MILLIGRAM(S): at 08:56

## 2019-12-27 RX ADMIN — Medication 150 MILLIGRAM(S): at 08:56

## 2019-12-27 RX ADMIN — ATORVASTATIN CALCIUM 20 MILLIGRAM(S): 80 TABLET, FILM COATED ORAL at 20:01

## 2019-12-27 RX ADMIN — Medication 0.5 MILLIGRAM(S): at 20:01

## 2019-12-27 RX ADMIN — LISINOPRIL 2.5 MILLIGRAM(S): 2.5 TABLET ORAL at 08:56

## 2019-12-28 RX ADMIN — ATORVASTATIN CALCIUM 20 MILLIGRAM(S): 80 TABLET, FILM COATED ORAL at 20:49

## 2019-12-28 RX ADMIN — Medication 0.5 MILLIGRAM(S): at 09:11

## 2019-12-28 RX ADMIN — Medication 3 MILLIGRAM(S): at 20:49

## 2019-12-28 RX ADMIN — Medication 1 TABLET(S): at 09:12

## 2019-12-28 RX ADMIN — Medication 0.5 MILLIGRAM(S): at 20:49

## 2019-12-28 RX ADMIN — Medication 0.5 MILLIGRAM(S): at 13:58

## 2019-12-28 RX ADMIN — LISINOPRIL 2.5 MILLIGRAM(S): 2.5 TABLET ORAL at 09:12

## 2019-12-28 RX ADMIN — Medication 50 MILLIGRAM(S): at 09:12

## 2019-12-28 RX ADMIN — Medication 150 MILLIGRAM(S): at 09:12

## 2019-12-28 RX ADMIN — SERTRALINE 100 MILLIGRAM(S): 25 TABLET, FILM COATED ORAL at 09:12

## 2019-12-29 RX ADMIN — Medication 3 MILLIGRAM(S): at 20:43

## 2019-12-29 RX ADMIN — ATORVASTATIN CALCIUM 20 MILLIGRAM(S): 80 TABLET, FILM COATED ORAL at 20:43

## 2019-12-29 RX ADMIN — Medication 1 TABLET(S): at 09:22

## 2019-12-29 RX ADMIN — SERTRALINE 100 MILLIGRAM(S): 25 TABLET, FILM COATED ORAL at 09:22

## 2019-12-29 RX ADMIN — Medication 0.5 MILLIGRAM(S): at 12:56

## 2019-12-29 RX ADMIN — Medication 0.5 MILLIGRAM(S): at 20:43

## 2019-12-29 RX ADMIN — Medication 150 MILLIGRAM(S): at 09:22

## 2019-12-29 RX ADMIN — Medication 0.5 MILLIGRAM(S): at 09:22

## 2019-12-30 PROCEDURE — 99232 SBSQ HOSP IP/OBS MODERATE 35: CPT

## 2019-12-30 RX ADMIN — SERTRALINE 100 MILLIGRAM(S): 25 TABLET, FILM COATED ORAL at 08:51

## 2019-12-30 RX ADMIN — Medication 0.5 MILLIGRAM(S): at 12:39

## 2019-12-30 RX ADMIN — Medication 0.5 MILLIGRAM(S): at 08:51

## 2019-12-30 RX ADMIN — Medication 1 TABLET(S): at 08:51

## 2019-12-30 RX ADMIN — Medication 3 MILLIGRAM(S): at 21:02

## 2019-12-30 RX ADMIN — Medication 150 MILLIGRAM(S): at 08:51

## 2019-12-30 RX ADMIN — LISINOPRIL 2.5 MILLIGRAM(S): 2.5 TABLET ORAL at 08:51

## 2019-12-30 RX ADMIN — Medication 50 MILLIGRAM(S): at 08:51

## 2019-12-30 RX ADMIN — ATORVASTATIN CALCIUM 20 MILLIGRAM(S): 80 TABLET, FILM COATED ORAL at 21:02

## 2019-12-30 RX ADMIN — Medication 0.5 MILLIGRAM(S): at 21:02

## 2019-12-31 PROCEDURE — 99232 SBSQ HOSP IP/OBS MODERATE 35: CPT

## 2019-12-31 RX ORDER — VENLAFAXINE HCL 75 MG
75 CAPSULE, EXT RELEASE 24 HR ORAL DAILY
Refills: 0 | Status: DISCONTINUED | OUTPATIENT
Start: 2019-12-31 | End: 2020-01-02

## 2019-12-31 RX ADMIN — Medication 0.5 MILLIGRAM(S): at 12:35

## 2019-12-31 RX ADMIN — Medication 0.5 MILLIGRAM(S): at 20:34

## 2019-12-31 RX ADMIN — Medication 650 MILLIGRAM(S): at 11:24

## 2019-12-31 RX ADMIN — Medication 3 MILLIGRAM(S): at 20:35

## 2019-12-31 RX ADMIN — Medication 1 TABLET(S): at 08:38

## 2019-12-31 RX ADMIN — Medication 650 MILLIGRAM(S): at 12:35

## 2019-12-31 RX ADMIN — ATORVASTATIN CALCIUM 20 MILLIGRAM(S): 80 TABLET, FILM COATED ORAL at 20:35

## 2019-12-31 RX ADMIN — SERTRALINE 100 MILLIGRAM(S): 25 TABLET, FILM COATED ORAL at 08:38

## 2019-12-31 RX ADMIN — Medication 150 MILLIGRAM(S): at 08:38

## 2019-12-31 RX ADMIN — Medication 0.5 MILLIGRAM(S): at 08:38

## 2019-12-31 RX ADMIN — LISINOPRIL 2.5 MILLIGRAM(S): 2.5 TABLET ORAL at 08:38

## 2019-12-31 RX ADMIN — Medication 50 MILLIGRAM(S): at 08:38

## 2020-01-01 ENCOUNTER — OUTPATIENT (OUTPATIENT)
Dept: OUTPATIENT SERVICES | Facility: HOSPITAL | Age: 80
LOS: 1 days | End: 2020-01-01
Payer: MEDICARE

## 2020-01-01 PROCEDURE — G9001: CPT

## 2020-01-01 RX ADMIN — ATORVASTATIN CALCIUM 20 MILLIGRAM(S): 80 TABLET, FILM COATED ORAL at 20:07

## 2020-01-01 RX ADMIN — Medication 75 MILLIGRAM(S): at 10:06

## 2020-01-01 RX ADMIN — Medication 3 MILLIGRAM(S): at 20:07

## 2020-01-01 RX ADMIN — Medication 0.5 MILLIGRAM(S): at 10:06

## 2020-01-01 RX ADMIN — Medication 1 TABLET(S): at 10:06

## 2020-01-01 RX ADMIN — Medication 0.5 MILLIGRAM(S): at 20:07

## 2020-01-01 RX ADMIN — SERTRALINE 100 MILLIGRAM(S): 25 TABLET, FILM COATED ORAL at 10:06

## 2020-01-01 RX ADMIN — Medication 0.5 MILLIGRAM(S): at 14:00

## 2020-01-02 PROCEDURE — 99232 SBSQ HOSP IP/OBS MODERATE 35: CPT

## 2020-01-02 RX ADMIN — Medication 0.5 MILLIGRAM(S): at 20:41

## 2020-01-02 RX ADMIN — SERTRALINE 100 MILLIGRAM(S): 25 TABLET, FILM COATED ORAL at 09:15

## 2020-01-02 RX ADMIN — LISINOPRIL 2.5 MILLIGRAM(S): 2.5 TABLET ORAL at 09:15

## 2020-01-02 RX ADMIN — Medication 0.5 MILLIGRAM(S): at 13:00

## 2020-01-02 RX ADMIN — Medication 0.5 MILLIGRAM(S): at 09:15

## 2020-01-02 RX ADMIN — Medication 50 MILLIGRAM(S): at 09:15

## 2020-01-02 RX ADMIN — ATORVASTATIN CALCIUM 20 MILLIGRAM(S): 80 TABLET, FILM COATED ORAL at 20:41

## 2020-01-02 RX ADMIN — Medication 1 TABLET(S): at 09:15

## 2020-01-02 RX ADMIN — Medication 3 MILLIGRAM(S): at 20:41

## 2020-01-03 LAB
ALBUMIN SERPL ELPH-MCNC: 3.9 G/DL — SIGNIFICANT CHANGE UP (ref 3.3–5)
ALP SERPL-CCNC: 86 U/L — SIGNIFICANT CHANGE UP (ref 40–120)
ALT FLD-CCNC: 15 U/L — SIGNIFICANT CHANGE UP (ref 4–33)
ANION GAP SERPL CALC-SCNC: 14 MMO/L — SIGNIFICANT CHANGE UP (ref 7–14)
AST SERPL-CCNC: 19 U/L — SIGNIFICANT CHANGE UP (ref 4–32)
BILIRUB SERPL-MCNC: 0.4 MG/DL — SIGNIFICANT CHANGE UP (ref 0.2–1.2)
BUN SERPL-MCNC: 15 MG/DL — SIGNIFICANT CHANGE UP (ref 7–23)
CALCIUM SERPL-MCNC: 10.1 MG/DL — SIGNIFICANT CHANGE UP (ref 8.4–10.5)
CHLORIDE SERPL-SCNC: 99 MMOL/L — SIGNIFICANT CHANGE UP (ref 98–107)
CO2 SERPL-SCNC: 25 MMOL/L — SIGNIFICANT CHANGE UP (ref 22–31)
CREAT SERPL-MCNC: 0.63 MG/DL — SIGNIFICANT CHANGE UP (ref 0.5–1.3)
GLUCOSE SERPL-MCNC: 93 MG/DL — SIGNIFICANT CHANGE UP (ref 70–99)
HCT VFR BLD CALC: 45 % — SIGNIFICANT CHANGE UP (ref 34.5–45)
HGB BLD-MCNC: 14.4 G/DL — SIGNIFICANT CHANGE UP (ref 11.5–15.5)
MCHC RBC-ENTMCNC: 30.3 PG — SIGNIFICANT CHANGE UP (ref 27–34)
MCHC RBC-ENTMCNC: 32 % — SIGNIFICANT CHANGE UP (ref 32–36)
MCV RBC AUTO: 94.7 FL — SIGNIFICANT CHANGE UP (ref 80–100)
NRBC # FLD: 0 K/UL — SIGNIFICANT CHANGE UP (ref 0–0)
PLATELET # BLD AUTO: 302 K/UL — SIGNIFICANT CHANGE UP (ref 150–400)
PMV BLD: 9.5 FL — SIGNIFICANT CHANGE UP (ref 7–13)
POTASSIUM SERPL-MCNC: 4.2 MMOL/L — SIGNIFICANT CHANGE UP (ref 3.5–5.3)
POTASSIUM SERPL-SCNC: 4.2 MMOL/L — SIGNIFICANT CHANGE UP (ref 3.5–5.3)
PROT SERPL-MCNC: 6.9 G/DL — SIGNIFICANT CHANGE UP (ref 6–8.3)
RBC # BLD: 4.75 M/UL — SIGNIFICANT CHANGE UP (ref 3.8–5.2)
RBC # FLD: 13 % — SIGNIFICANT CHANGE UP (ref 10.3–14.5)
SODIUM SERPL-SCNC: 138 MMOL/L — SIGNIFICANT CHANGE UP (ref 135–145)
WBC # BLD: 5.45 K/UL — SIGNIFICANT CHANGE UP (ref 3.8–10.5)
WBC # FLD AUTO: 5.45 K/UL — SIGNIFICANT CHANGE UP (ref 3.8–10.5)

## 2020-01-03 PROCEDURE — 99232 SBSQ HOSP IP/OBS MODERATE 35: CPT

## 2020-01-03 RX ORDER — BUPROPION HYDROCHLORIDE 150 MG/1
150 TABLET, EXTENDED RELEASE ORAL DAILY
Refills: 0 | Status: DISCONTINUED | OUTPATIENT
Start: 2020-01-03 | End: 2020-01-09

## 2020-01-03 RX ADMIN — Medication 1 TABLET(S): at 08:23

## 2020-01-03 RX ADMIN — Medication 0.5 MILLIGRAM(S): at 20:25

## 2020-01-03 RX ADMIN — BUPROPION HYDROCHLORIDE 150 MILLIGRAM(S): 150 TABLET, EXTENDED RELEASE ORAL at 08:40

## 2020-01-03 RX ADMIN — Medication 3 MILLIGRAM(S): at 20:25

## 2020-01-03 RX ADMIN — Medication 0.5 MILLIGRAM(S): at 12:52

## 2020-01-03 RX ADMIN — SERTRALINE 100 MILLIGRAM(S): 25 TABLET, FILM COATED ORAL at 08:23

## 2020-01-03 RX ADMIN — Medication 50 MILLIGRAM(S): at 08:23

## 2020-01-03 RX ADMIN — LISINOPRIL 2.5 MILLIGRAM(S): 2.5 TABLET ORAL at 08:23

## 2020-01-03 RX ADMIN — Medication 650 MILLIGRAM(S): at 10:22

## 2020-01-03 RX ADMIN — Medication 0.5 MILLIGRAM(S): at 08:23

## 2020-01-03 RX ADMIN — ATORVASTATIN CALCIUM 20 MILLIGRAM(S): 80 TABLET, FILM COATED ORAL at 20:25

## 2020-01-03 RX ADMIN — Medication 650 MILLIGRAM(S): at 09:22

## 2020-01-04 RX ADMIN — BUPROPION HYDROCHLORIDE 150 MILLIGRAM(S): 150 TABLET, EXTENDED RELEASE ORAL at 08:47

## 2020-01-04 RX ADMIN — Medication 0.5 MILLIGRAM(S): at 08:47

## 2020-01-04 RX ADMIN — Medication 1 TABLET(S): at 08:47

## 2020-01-04 RX ADMIN — Medication 650 MILLIGRAM(S): at 09:35

## 2020-01-04 RX ADMIN — Medication 650 MILLIGRAM(S): at 10:20

## 2020-01-04 RX ADMIN — SERTRALINE 100 MILLIGRAM(S): 25 TABLET, FILM COATED ORAL at 08:47

## 2020-01-04 RX ADMIN — Medication 650 MILLIGRAM(S): at 18:20

## 2020-01-04 RX ADMIN — Medication 3 MILLIGRAM(S): at 20:18

## 2020-01-04 RX ADMIN — Medication 650 MILLIGRAM(S): at 18:58

## 2020-01-04 RX ADMIN — Medication 0.5 MILLIGRAM(S): at 13:20

## 2020-01-04 RX ADMIN — ATORVASTATIN CALCIUM 20 MILLIGRAM(S): 80 TABLET, FILM COATED ORAL at 20:18

## 2020-01-04 RX ADMIN — Medication 0.5 MILLIGRAM(S): at 20:18

## 2020-01-05 RX ADMIN — Medication 0.5 MILLIGRAM(S): at 12:46

## 2020-01-05 RX ADMIN — Medication 3 MILLIGRAM(S): at 20:16

## 2020-01-05 RX ADMIN — Medication 1 TABLET(S): at 08:24

## 2020-01-05 RX ADMIN — SERTRALINE 100 MILLIGRAM(S): 25 TABLET, FILM COATED ORAL at 08:24

## 2020-01-05 RX ADMIN — BUPROPION HYDROCHLORIDE 150 MILLIGRAM(S): 150 TABLET, EXTENDED RELEASE ORAL at 08:24

## 2020-01-05 RX ADMIN — ATORVASTATIN CALCIUM 20 MILLIGRAM(S): 80 TABLET, FILM COATED ORAL at 20:16

## 2020-01-05 RX ADMIN — Medication 50 MILLIGRAM(S): at 08:24

## 2020-01-05 RX ADMIN — LISINOPRIL 2.5 MILLIGRAM(S): 2.5 TABLET ORAL at 08:24

## 2020-01-05 RX ADMIN — Medication 650 MILLIGRAM(S): at 08:27

## 2020-01-05 RX ADMIN — Medication 0.5 MILLIGRAM(S): at 20:16

## 2020-01-05 RX ADMIN — Medication 650 MILLIGRAM(S): at 09:15

## 2020-01-05 RX ADMIN — Medication 0.5 MILLIGRAM(S): at 08:24

## 2020-01-06 PROCEDURE — 93010 ELECTROCARDIOGRAM REPORT: CPT

## 2020-01-06 PROCEDURE — 71045 X-RAY EXAM CHEST 1 VIEW: CPT | Mod: 26

## 2020-01-06 PROCEDURE — 99232 SBSQ HOSP IP/OBS MODERATE 35: CPT

## 2020-01-06 RX ADMIN — ATORVASTATIN CALCIUM 20 MILLIGRAM(S): 80 TABLET, FILM COATED ORAL at 20:20

## 2020-01-06 RX ADMIN — Medication 650 MILLIGRAM(S): at 10:57

## 2020-01-06 RX ADMIN — Medication 0.5 MILLIGRAM(S): at 12:53

## 2020-01-06 RX ADMIN — Medication 650 MILLIGRAM(S): at 10:21

## 2020-01-06 RX ADMIN — Medication 1 TABLET(S): at 08:10

## 2020-01-06 RX ADMIN — BUPROPION HYDROCHLORIDE 150 MILLIGRAM(S): 150 TABLET, EXTENDED RELEASE ORAL at 08:10

## 2020-01-06 RX ADMIN — Medication 0.5 MILLIGRAM(S): at 08:10

## 2020-01-06 RX ADMIN — Medication 3 MILLIGRAM(S): at 20:20

## 2020-01-06 RX ADMIN — Medication 0.5 MILLIGRAM(S): at 20:20

## 2020-01-06 RX ADMIN — SERTRALINE 100 MILLIGRAM(S): 25 TABLET, FILM COATED ORAL at 08:10

## 2020-01-07 PROCEDURE — 99223 1ST HOSP IP/OBS HIGH 75: CPT

## 2020-01-07 PROCEDURE — 99232 SBSQ HOSP IP/OBS MODERATE 35: CPT

## 2020-01-07 RX ADMIN — ATORVASTATIN CALCIUM 20 MILLIGRAM(S): 80 TABLET, FILM COATED ORAL at 20:09

## 2020-01-07 RX ADMIN — POLYETHYLENE GLYCOL 3350 17 GRAM(S): 17 POWDER, FOR SOLUTION ORAL at 09:01

## 2020-01-07 RX ADMIN — Medication 50 MILLIGRAM(S): at 08:35

## 2020-01-07 RX ADMIN — Medication 0.5 MILLIGRAM(S): at 13:30

## 2020-01-07 RX ADMIN — Medication 0.5 MILLIGRAM(S): at 20:09

## 2020-01-07 RX ADMIN — LISINOPRIL 2.5 MILLIGRAM(S): 2.5 TABLET ORAL at 08:35

## 2020-01-07 RX ADMIN — Medication 650 MILLIGRAM(S): at 20:41

## 2020-01-07 RX ADMIN — Medication 650 MILLIGRAM(S): at 09:01

## 2020-01-07 RX ADMIN — Medication 650 MILLIGRAM(S): at 21:20

## 2020-01-07 RX ADMIN — Medication 3 MILLIGRAM(S): at 20:09

## 2020-01-07 RX ADMIN — BUPROPION HYDROCHLORIDE 150 MILLIGRAM(S): 150 TABLET, EXTENDED RELEASE ORAL at 08:35

## 2020-01-07 RX ADMIN — Medication 1 TABLET(S): at 08:57

## 2020-01-07 RX ADMIN — Medication 0.5 MILLIGRAM(S): at 08:10

## 2020-01-07 RX ADMIN — Medication 650 MILLIGRAM(S): at 11:48

## 2020-01-07 NOTE — CONSULT NOTE ADULT - ASSESSMENT
79F with depression with need for ECT, pre-procedure evaluation:   ECT specific risk assessment: pt without history of increased ICP, aneurysm, active pulmonary disease, valvular disease, CAD, cerebral vascular disease.     Cardiovascular risk assessment:   The patient is at low risk for cardiovascular complications from the low risk procedure, ECT.     Risk for complication is low. Patient is medically optimized for ECT treatment without further intervention and can proceed with treatment.    Anesthesia specific concerns :    History of adverse reaction to anesthesia previously: none  Esophageal Assessment: normal  Known Spine issues: none  CKD: not present    Further risk reduction will involve medical management of other comorbid conditions:    HTN: Continue metoprolol and lisinopril.  Meds may be given on ECT days with sips of water.  Continue statin for ASCVD prevention.

## 2020-01-07 NOTE — CONSULT NOTE ADULT - SUBJECTIVE AND OBJECTIVE BOX
HPI: Pt is 79F admitted for depression, ECT is planned.  The patient has no history of heart disease, TIA, CVA, MI, or KNIGHT.  She can walk up stairs.  She had gall bladder removed with no complications from anesthesia.    PAST MEDICAL & SURGICAL HISTORY:  HTN (hypertension)  cholecystectomy      Review of Systems:   CONSTITUTIONAL: No fever, weight loss, or fatigue  EYES: No eye pain, visual disturbances, or discharge  ENMT:  No difficulty hearing, tinnitus, vertigo; No sinus or throat pain  NECK: No pain or stiffness  RESPIRATORY: No cough, wheezing, chills or hemoptysis; No shortness of breath  CARDIOVASCULAR: No chest pain, palpitations, dizziness, or leg swelling  GASTROINTESTINAL: No abdominal or epigastric pain. No nausea, vomiting, or hematemesis; No diarrhea or constipation. No melena or hematochezia.  GENITOURINARY: No dysuria, frequency, hematuria, or incontinence  NEUROLOGICAL: No headaches, memory loss, loss of strength, numbness, or tremors  SKIN: No itching, burning, rashes, or lesions   LYMPH NODES: No enlarged glands  ENDOCRINE: No heat or cold intolerance; No hair loss  MUSCULOSKELETAL: No joint pain or swelling; No muscle, back, or extremity pain  HEME/LYMPH: No easy bruising, or bleeding gums  ALLERY AND IMMUNOLOGIC: No hives or eczema    Allergies    No Known Allergies    Intolerances        Social History: denies etoh tob idu    FAMILY HISTORY:noncontributory      MEDICATIONS  (STANDING):  atorvastatin 20 milliGRAM(s) Oral at bedtime  buPROPion  milliGRAM(s) Oral daily  lisinopril 2.5 milliGRAM(s) Oral daily  LORazepam     Tablet 0.5 milliGRAM(s) Oral three times a day  melatonin. 3 milliGRAM(s) Oral at bedtime  metoprolol succinate ER 50 milliGRAM(s) Oral daily  multivitamin 1 Tablet(s) Oral daily    MEDICATIONS  (PRN):  acetaminophen   Tablet .. 650 milliGRAM(s) Oral every 6 hours PRN Mild Pain (1 - 3)  polyethylene glycol 3350 17 Gram(s) Oral daily PRN constipation  saline laxative (FLEET) Rectal Enema 1 Enema Rectal daily PRN constipation  senna 2 Tablet(s) Oral at bedtime PRN constipation  sodium chloride 0.65% Nasal 1 Spray(s) Both Nostrils every 4 hours PRN Nasal Congestion      Vital Signs Last 24 Hrs  T(C): 36.8 (07 Jan 2020 15:29), Max: 36.8 (07 Jan 2020 15:29)  T(F): 98.2 (07 Jan 2020 15:29), Max: 98.2 (07 Jan 2020 15:29)  HR: 79  BP: 149/71  BP(mean): --  RR: 15  SpO2: --  CAPILLARY BLOOD GLUCOSE            PHYSICAL EXAM:  GENERAL: NAD, well-developed  HEAD:  Atraumatic, Normocephalic  EYES: EOMI, conjunctiva and sclera clear  NECK: Supple, No JVD  CHEST/LUNG: Clear to auscultation bilaterally; No wheeze  HEART: Regular rate and rhythm; No murmurs, rubs, or gallops  ABDOMEN: Soft, Nontender, Nondistended; Bowel sounds present  EXTREMITIES:  2+ Peripheral Pulses, No clubbing, cyanosis, or edema  NEUROLOGY: non-focal  SKIN: No rashes or lesions    LABS:    reviewed in [place], normal                EKG(personally reviewed): NSR 70 nonspecific T wave change, no changes vs past EKG in system    Care Discussed with Consultants/Other Providers: Dr Prather

## 2020-01-08 PROCEDURE — 99232 SBSQ HOSP IP/OBS MODERATE 35: CPT | Mod: GC

## 2020-01-08 RX ADMIN — LISINOPRIL 2.5 MILLIGRAM(S): 2.5 TABLET ORAL at 08:17

## 2020-01-08 RX ADMIN — Medication 0.5 MILLIGRAM(S): at 20:30

## 2020-01-08 RX ADMIN — Medication 3 MILLIGRAM(S): at 20:30

## 2020-01-08 RX ADMIN — BUPROPION HYDROCHLORIDE 150 MILLIGRAM(S): 150 TABLET, EXTENDED RELEASE ORAL at 08:17

## 2020-01-08 RX ADMIN — Medication 1 TABLET(S): at 08:17

## 2020-01-08 RX ADMIN — Medication 50 MILLIGRAM(S): at 08:17

## 2020-01-08 RX ADMIN — ATORVASTATIN CALCIUM 20 MILLIGRAM(S): 80 TABLET, FILM COATED ORAL at 20:30

## 2020-01-08 RX ADMIN — Medication 0.5 MILLIGRAM(S): at 13:07

## 2020-01-08 RX ADMIN — Medication 0.5 MILLIGRAM(S): at 08:17

## 2020-01-09 PROCEDURE — 99232 SBSQ HOSP IP/OBS MODERATE 35: CPT

## 2020-01-09 RX ORDER — BUPROPION HYDROCHLORIDE 150 MG/1
300 TABLET, EXTENDED RELEASE ORAL DAILY
Refills: 0 | Status: DISCONTINUED | OUTPATIENT
Start: 2020-01-09 | End: 2020-03-02

## 2020-01-09 RX ADMIN — Medication 650 MILLIGRAM(S): at 06:54

## 2020-01-09 RX ADMIN — Medication 0.5 MILLIGRAM(S): at 06:32

## 2020-01-09 RX ADMIN — ATORVASTATIN CALCIUM 20 MILLIGRAM(S): 80 TABLET, FILM COATED ORAL at 20:14

## 2020-01-09 RX ADMIN — Medication 50 MILLIGRAM(S): at 08:04

## 2020-01-09 RX ADMIN — Medication 1 TABLET(S): at 08:04

## 2020-01-09 RX ADMIN — Medication 1 DROP(S): at 08:04

## 2020-01-09 RX ADMIN — Medication 0.5 MILLIGRAM(S): at 20:14

## 2020-01-09 RX ADMIN — Medication 3 MILLIGRAM(S): at 20:14

## 2020-01-09 RX ADMIN — Medication 0.5 MILLIGRAM(S): at 12:31

## 2020-01-09 RX ADMIN — Medication 650 MILLIGRAM(S): at 16:42

## 2020-01-09 RX ADMIN — Medication 650 MILLIGRAM(S): at 06:22

## 2020-01-09 RX ADMIN — LISINOPRIL 2.5 MILLIGRAM(S): 2.5 TABLET ORAL at 08:04

## 2020-01-09 RX ADMIN — Medication 650 MILLIGRAM(S): at 17:43

## 2020-01-09 RX ADMIN — BUPROPION HYDROCHLORIDE 300 MILLIGRAM(S): 150 TABLET, EXTENDED RELEASE ORAL at 09:22

## 2020-01-10 PROCEDURE — 99232 SBSQ HOSP IP/OBS MODERATE 35: CPT | Mod: GC

## 2020-01-10 RX ADMIN — Medication 0.5 MILLIGRAM(S): at 20:05

## 2020-01-10 RX ADMIN — ATORVASTATIN CALCIUM 20 MILLIGRAM(S): 80 TABLET, FILM COATED ORAL at 20:05

## 2020-01-10 RX ADMIN — Medication 1 DROP(S): at 08:21

## 2020-01-10 RX ADMIN — Medication 0.5 MILLIGRAM(S): at 12:57

## 2020-01-10 RX ADMIN — Medication 0.5 MILLIGRAM(S): at 06:41

## 2020-01-10 RX ADMIN — LISINOPRIL 2.5 MILLIGRAM(S): 2.5 TABLET ORAL at 08:22

## 2020-01-10 RX ADMIN — Medication 650 MILLIGRAM(S): at 06:00

## 2020-01-10 RX ADMIN — Medication 1 TABLET(S): at 08:22

## 2020-01-10 RX ADMIN — Medication 650 MILLIGRAM(S): at 20:06

## 2020-01-10 RX ADMIN — BUPROPION HYDROCHLORIDE 300 MILLIGRAM(S): 150 TABLET, EXTENDED RELEASE ORAL at 08:22

## 2020-01-10 RX ADMIN — Medication 650 MILLIGRAM(S): at 07:17

## 2020-01-10 RX ADMIN — Medication 3 MILLIGRAM(S): at 20:05

## 2020-01-10 RX ADMIN — Medication 650 MILLIGRAM(S): at 21:04

## 2020-01-11 LAB
ALBUMIN SERPL ELPH-MCNC: 4.3 G/DL — SIGNIFICANT CHANGE UP (ref 3.3–5)
ALP SERPL-CCNC: 85 U/L — SIGNIFICANT CHANGE UP (ref 40–120)
ALT FLD-CCNC: 13 U/L — SIGNIFICANT CHANGE UP (ref 4–33)
ANION GAP SERPL CALC-SCNC: 15 MMO/L — HIGH (ref 7–14)
AST SERPL-CCNC: 19 U/L — SIGNIFICANT CHANGE UP (ref 4–32)
BASOPHILS # BLD AUTO: 0.05 K/UL — SIGNIFICANT CHANGE UP (ref 0–0.2)
BASOPHILS NFR BLD AUTO: 1 % — SIGNIFICANT CHANGE UP (ref 0–2)
BILIRUB SERPL-MCNC: 0.4 MG/DL — SIGNIFICANT CHANGE UP (ref 0.2–1.2)
BUN SERPL-MCNC: 21 MG/DL — SIGNIFICANT CHANGE UP (ref 7–23)
CALCIUM SERPL-MCNC: 10.3 MG/DL — SIGNIFICANT CHANGE UP (ref 8.4–10.5)
CHLORIDE SERPL-SCNC: 99 MMOL/L — SIGNIFICANT CHANGE UP (ref 98–107)
CO2 SERPL-SCNC: 26 MMOL/L — SIGNIFICANT CHANGE UP (ref 22–31)
CREAT SERPL-MCNC: 0.67 MG/DL — SIGNIFICANT CHANGE UP (ref 0.5–1.3)
EOSINOPHIL # BLD AUTO: 0.17 K/UL — SIGNIFICANT CHANGE UP (ref 0–0.5)
EOSINOPHIL NFR BLD AUTO: 3.3 % — SIGNIFICANT CHANGE UP (ref 0–6)
GLUCOSE SERPL-MCNC: 162 MG/DL — HIGH (ref 70–99)
HCT VFR BLD CALC: 44 % — SIGNIFICANT CHANGE UP (ref 34.5–45)
HGB BLD-MCNC: 14.2 G/DL — SIGNIFICANT CHANGE UP (ref 11.5–15.5)
IMM GRANULOCYTES NFR BLD AUTO: 0.2 % — SIGNIFICANT CHANGE UP (ref 0–1.5)
LYMPHOCYTES # BLD AUTO: 1.7 K/UL — SIGNIFICANT CHANGE UP (ref 1–3.3)
LYMPHOCYTES # BLD AUTO: 32.8 % — SIGNIFICANT CHANGE UP (ref 13–44)
MCHC RBC-ENTMCNC: 29.7 PG — SIGNIFICANT CHANGE UP (ref 27–34)
MCHC RBC-ENTMCNC: 32.3 % — SIGNIFICANT CHANGE UP (ref 32–36)
MCV RBC AUTO: 92.1 FL — SIGNIFICANT CHANGE UP (ref 80–100)
MONOCYTES # BLD AUTO: 0.37 K/UL — SIGNIFICANT CHANGE UP (ref 0–0.9)
MONOCYTES NFR BLD AUTO: 7.1 % — SIGNIFICANT CHANGE UP (ref 2–14)
NEUTROPHILS # BLD AUTO: 2.88 K/UL — SIGNIFICANT CHANGE UP (ref 1.8–7.4)
NEUTROPHILS NFR BLD AUTO: 55.6 % — SIGNIFICANT CHANGE UP (ref 43–77)
NRBC # FLD: 0 K/UL — SIGNIFICANT CHANGE UP (ref 0–0)
PLATELET # BLD AUTO: 332 K/UL — SIGNIFICANT CHANGE UP (ref 150–400)
PMV BLD: 9.2 FL — SIGNIFICANT CHANGE UP (ref 7–13)
POTASSIUM SERPL-MCNC: 3.7 MMOL/L — SIGNIFICANT CHANGE UP (ref 3.5–5.3)
POTASSIUM SERPL-SCNC: 3.7 MMOL/L — SIGNIFICANT CHANGE UP (ref 3.5–5.3)
PROT SERPL-MCNC: 6.8 G/DL — SIGNIFICANT CHANGE UP (ref 6–8.3)
RBC # BLD: 4.78 M/UL — SIGNIFICANT CHANGE UP (ref 3.8–5.2)
RBC # FLD: 12.9 % — SIGNIFICANT CHANGE UP (ref 10.3–14.5)
SODIUM SERPL-SCNC: 140 MMOL/L — SIGNIFICANT CHANGE UP (ref 135–145)
WBC # BLD: 5.18 K/UL — SIGNIFICANT CHANGE UP (ref 3.8–10.5)
WBC # FLD AUTO: 5.18 K/UL — SIGNIFICANT CHANGE UP (ref 3.8–10.5)

## 2020-01-11 PROCEDURE — 99231 SBSQ HOSP IP/OBS SF/LOW 25: CPT

## 2020-01-11 RX ADMIN — LISINOPRIL 2.5 MILLIGRAM(S): 2.5 TABLET ORAL at 08:16

## 2020-01-11 RX ADMIN — Medication 0.5 MILLIGRAM(S): at 06:08

## 2020-01-11 RX ADMIN — Medication 0.5 MILLIGRAM(S): at 20:10

## 2020-01-11 RX ADMIN — Medication 1 TABLET(S): at 08:16

## 2020-01-11 RX ADMIN — Medication 650 MILLIGRAM(S): at 19:40

## 2020-01-11 RX ADMIN — BUPROPION HYDROCHLORIDE 300 MILLIGRAM(S): 150 TABLET, EXTENDED RELEASE ORAL at 08:16

## 2020-01-11 RX ADMIN — Medication 50 MILLIGRAM(S): at 08:16

## 2020-01-11 RX ADMIN — Medication 650 MILLIGRAM(S): at 11:30

## 2020-01-11 RX ADMIN — Medication 650 MILLIGRAM(S): at 20:56

## 2020-01-11 RX ADMIN — Medication 3 MILLIGRAM(S): at 20:10

## 2020-01-11 RX ADMIN — Medication 1 DROP(S): at 08:19

## 2020-01-11 RX ADMIN — ATORVASTATIN CALCIUM 20 MILLIGRAM(S): 80 TABLET, FILM COATED ORAL at 20:10

## 2020-01-11 RX ADMIN — Medication 0.5 MILLIGRAM(S): at 12:39

## 2020-01-11 RX ADMIN — Medication 650 MILLIGRAM(S): at 10:31

## 2020-01-11 RX ADMIN — Medication 650 MILLIGRAM(S): at 02:29

## 2020-01-11 RX ADMIN — Medication 650 MILLIGRAM(S): at 06:08

## 2020-01-12 PROCEDURE — 99231 SBSQ HOSP IP/OBS SF/LOW 25: CPT

## 2020-01-12 PROCEDURE — 93010 ELECTROCARDIOGRAM REPORT: CPT

## 2020-01-12 RX ADMIN — Medication 50 MILLIGRAM(S): at 08:57

## 2020-01-12 RX ADMIN — Medication 1 TABLET(S): at 08:57

## 2020-01-12 RX ADMIN — Medication 650 MILLIGRAM(S): at 20:36

## 2020-01-12 RX ADMIN — Medication 0.5 MILLIGRAM(S): at 12:18

## 2020-01-12 RX ADMIN — ATORVASTATIN CALCIUM 20 MILLIGRAM(S): 80 TABLET, FILM COATED ORAL at 20:03

## 2020-01-12 RX ADMIN — LISINOPRIL 2.5 MILLIGRAM(S): 2.5 TABLET ORAL at 08:56

## 2020-01-12 RX ADMIN — Medication 0.5 MILLIGRAM(S): at 20:03

## 2020-01-12 RX ADMIN — Medication 0.5 MILLIGRAM(S): at 05:59

## 2020-01-12 RX ADMIN — Medication 650 MILLIGRAM(S): at 13:48

## 2020-01-12 RX ADMIN — Medication 650 MILLIGRAM(S): at 15:00

## 2020-01-12 RX ADMIN — Medication 650 MILLIGRAM(S): at 21:38

## 2020-01-12 RX ADMIN — Medication 3 MILLIGRAM(S): at 20:02

## 2020-01-12 RX ADMIN — BUPROPION HYDROCHLORIDE 300 MILLIGRAM(S): 150 TABLET, EXTENDED RELEASE ORAL at 08:56

## 2020-01-13 PROCEDURE — 90870 ELECTROCONVULSIVE THERAPY: CPT

## 2020-01-13 PROCEDURE — 99232 SBSQ HOSP IP/OBS MODERATE 35: CPT | Mod: GC,25

## 2020-01-13 RX ORDER — FLUMAZENIL 0.1 MG/ML
0.2 VIAL (ML) INTRAVENOUS ONCE
Refills: 0 | Status: COMPLETED | OUTPATIENT
Start: 2020-01-13 | End: 2020-01-13

## 2020-01-13 RX ORDER — MIDAZOLAM HYDROCHLORIDE 1 MG/ML
2 INJECTION, SOLUTION INTRAMUSCULAR; INTRAVENOUS ONCE
Refills: 0 | Status: DISCONTINUED | OUTPATIENT
Start: 2020-01-13 | End: 2020-01-13

## 2020-01-13 RX ADMIN — Medication 0.5 MILLIGRAM(S): at 20:29

## 2020-01-13 RX ADMIN — Medication 650 MILLIGRAM(S): at 18:16

## 2020-01-13 RX ADMIN — Medication 3 MILLIGRAM(S): at 20:30

## 2020-01-13 RX ADMIN — Medication 0.5 MILLIGRAM(S): at 12:50

## 2020-01-13 RX ADMIN — ATORVASTATIN CALCIUM 20 MILLIGRAM(S): 80 TABLET, FILM COATED ORAL at 20:29

## 2020-01-13 RX ADMIN — Medication 0.2 MILLIGRAM(S): at 10:34

## 2020-01-13 RX ADMIN — Medication 650 MILLIGRAM(S): at 03:40

## 2020-01-13 RX ADMIN — Medication 0.5 MILLIGRAM(S): at 08:10

## 2020-01-13 RX ADMIN — Medication 650 MILLIGRAM(S): at 05:25

## 2020-01-13 RX ADMIN — Medication 650 MILLIGRAM(S): at 16:16

## 2020-01-14 DIAGNOSIS — Z71.89 OTHER SPECIFIED COUNSELING: ICD-10-CM

## 2020-01-14 PROCEDURE — 99232 SBSQ HOSP IP/OBS MODERATE 35: CPT | Mod: GC

## 2020-01-14 RX ADMIN — Medication 3 MILLIGRAM(S): at 20:10

## 2020-01-14 RX ADMIN — Medication 1 TABLET(S): at 08:10

## 2020-01-14 RX ADMIN — Medication 650 MILLIGRAM(S): at 11:00

## 2020-01-14 RX ADMIN — ATORVASTATIN CALCIUM 20 MILLIGRAM(S): 80 TABLET, FILM COATED ORAL at 20:10

## 2020-01-14 RX ADMIN — Medication 650 MILLIGRAM(S): at 12:00

## 2020-01-14 RX ADMIN — Medication 50 MILLIGRAM(S): at 08:10

## 2020-01-14 RX ADMIN — Medication 0.5 MILLIGRAM(S): at 12:19

## 2020-01-14 RX ADMIN — Medication 0.5 MILLIGRAM(S): at 05:24

## 2020-01-14 RX ADMIN — Medication 0.5 MILLIGRAM(S): at 20:10

## 2020-01-14 RX ADMIN — BUPROPION HYDROCHLORIDE 300 MILLIGRAM(S): 150 TABLET, EXTENDED RELEASE ORAL at 08:10

## 2020-01-14 RX ADMIN — LISINOPRIL 2.5 MILLIGRAM(S): 2.5 TABLET ORAL at 08:10

## 2020-01-15 PROCEDURE — 90870 ELECTROCONVULSIVE THERAPY: CPT

## 2020-01-15 PROCEDURE — 99232 SBSQ HOSP IP/OBS MODERATE 35: CPT | Mod: GC,25

## 2020-01-15 RX ORDER — FLUMAZENIL 0.1 MG/ML
0.2 VIAL (ML) INTRAVENOUS ONCE
Refills: 0 | Status: COMPLETED | OUTPATIENT
Start: 2020-01-15 | End: 2020-01-15

## 2020-01-15 RX ADMIN — Medication 650 MILLIGRAM(S): at 21:55

## 2020-01-15 RX ADMIN — ATORVASTATIN CALCIUM 20 MILLIGRAM(S): 80 TABLET, FILM COATED ORAL at 20:19

## 2020-01-15 RX ADMIN — Medication 3 MILLIGRAM(S): at 20:19

## 2020-01-15 RX ADMIN — Medication 0.5 MILLIGRAM(S): at 20:19

## 2020-01-15 RX ADMIN — Medication 0.5 MILLIGRAM(S): at 14:02

## 2020-01-15 RX ADMIN — BUPROPION HYDROCHLORIDE 300 MILLIGRAM(S): 150 TABLET, EXTENDED RELEASE ORAL at 14:02

## 2020-01-15 RX ADMIN — Medication 0.2 MILLIGRAM(S): at 12:52

## 2020-01-15 RX ADMIN — Medication 1 TABLET(S): at 14:02

## 2020-01-15 RX ADMIN — Medication 0.5 MILLIGRAM(S): at 06:00

## 2020-01-15 RX ADMIN — Medication 650 MILLIGRAM(S): at 21:07

## 2020-01-16 PROCEDURE — 99232 SBSQ HOSP IP/OBS MODERATE 35: CPT | Mod: GC

## 2020-01-16 RX ADMIN — Medication 0.5 MILLIGRAM(S): at 13:39

## 2020-01-16 RX ADMIN — Medication 50 MILLIGRAM(S): at 08:36

## 2020-01-16 RX ADMIN — LISINOPRIL 2.5 MILLIGRAM(S): 2.5 TABLET ORAL at 08:36

## 2020-01-16 RX ADMIN — Medication 3 MILLIGRAM(S): at 20:14

## 2020-01-16 RX ADMIN — Medication 0.5 MILLIGRAM(S): at 05:12

## 2020-01-16 RX ADMIN — Medication 1 TABLET(S): at 08:36

## 2020-01-16 RX ADMIN — ATORVASTATIN CALCIUM 20 MILLIGRAM(S): 80 TABLET, FILM COATED ORAL at 20:14

## 2020-01-16 RX ADMIN — BUPROPION HYDROCHLORIDE 300 MILLIGRAM(S): 150 TABLET, EXTENDED RELEASE ORAL at 08:36

## 2020-01-16 RX ADMIN — Medication 0.5 MILLIGRAM(S): at 20:14

## 2020-01-17 PROCEDURE — 99232 SBSQ HOSP IP/OBS MODERATE 35: CPT | Mod: GC,25

## 2020-01-17 PROCEDURE — 90870 ELECTROCONVULSIVE THERAPY: CPT

## 2020-01-17 RX ORDER — FLUMAZENIL 0.1 MG/ML
0.2 VIAL (ML) INTRAVENOUS ONCE
Refills: 0 | Status: COMPLETED | OUTPATIENT
Start: 2020-01-17 | End: 2020-01-17

## 2020-01-17 RX ADMIN — Medication 3 MILLIGRAM(S): at 20:00

## 2020-01-17 RX ADMIN — Medication 0.5 MILLIGRAM(S): at 06:16

## 2020-01-17 RX ADMIN — Medication 1 TABLET(S): at 14:51

## 2020-01-17 RX ADMIN — BUPROPION HYDROCHLORIDE 300 MILLIGRAM(S): 150 TABLET, EXTENDED RELEASE ORAL at 14:51

## 2020-01-17 RX ADMIN — LISINOPRIL 2.5 MILLIGRAM(S): 2.5 TABLET ORAL at 14:51

## 2020-01-17 RX ADMIN — Medication 50 MILLIGRAM(S): at 14:51

## 2020-01-17 RX ADMIN — ATORVASTATIN CALCIUM 20 MILLIGRAM(S): 80 TABLET, FILM COATED ORAL at 20:00

## 2020-01-17 RX ADMIN — Medication 0.5 MILLIGRAM(S): at 20:00

## 2020-01-17 RX ADMIN — Medication 0.2 MILLIGRAM(S): at 12:31

## 2020-01-17 RX ADMIN — Medication 0.5 MILLIGRAM(S): at 14:50

## 2020-01-18 RX ADMIN — ATORVASTATIN CALCIUM 20 MILLIGRAM(S): 80 TABLET, FILM COATED ORAL at 20:42

## 2020-01-18 RX ADMIN — Medication 1 TABLET(S): at 08:45

## 2020-01-18 RX ADMIN — Medication 0.5 MILLIGRAM(S): at 06:06

## 2020-01-18 RX ADMIN — LISINOPRIL 2.5 MILLIGRAM(S): 2.5 TABLET ORAL at 08:44

## 2020-01-18 RX ADMIN — Medication 50 MILLIGRAM(S): at 08:45

## 2020-01-18 RX ADMIN — Medication 3 MILLIGRAM(S): at 20:42

## 2020-01-18 RX ADMIN — BUPROPION HYDROCHLORIDE 300 MILLIGRAM(S): 150 TABLET, EXTENDED RELEASE ORAL at 08:44

## 2020-01-18 RX ADMIN — Medication 0.5 MILLIGRAM(S): at 20:42

## 2020-01-18 RX ADMIN — Medication 0.5 MILLIGRAM(S): at 12:36

## 2020-01-19 RX ADMIN — BUPROPION HYDROCHLORIDE 300 MILLIGRAM(S): 150 TABLET, EXTENDED RELEASE ORAL at 09:00

## 2020-01-19 RX ADMIN — Medication 1 TABLET(S): at 09:00

## 2020-01-19 RX ADMIN — Medication 0.5 MILLIGRAM(S): at 06:13

## 2020-01-19 RX ADMIN — Medication 3 MILLIGRAM(S): at 20:09

## 2020-01-19 RX ADMIN — ATORVASTATIN CALCIUM 20 MILLIGRAM(S): 80 TABLET, FILM COATED ORAL at 20:09

## 2020-01-19 RX ADMIN — Medication 0.5 MILLIGRAM(S): at 12:21

## 2020-01-19 RX ADMIN — Medication 0.5 MILLIGRAM(S): at 20:09

## 2020-01-20 RX ADMIN — ATORVASTATIN CALCIUM 20 MILLIGRAM(S): 80 TABLET, FILM COATED ORAL at 20:08

## 2020-01-20 RX ADMIN — LISINOPRIL 2.5 MILLIGRAM(S): 2.5 TABLET ORAL at 08:21

## 2020-01-20 RX ADMIN — Medication 3 MILLIGRAM(S): at 20:08

## 2020-01-20 RX ADMIN — Medication 0.5 MILLIGRAM(S): at 06:27

## 2020-01-20 RX ADMIN — Medication 1 TABLET(S): at 08:21

## 2020-01-20 RX ADMIN — Medication 0.5 MILLIGRAM(S): at 12:46

## 2020-01-20 RX ADMIN — BUPROPION HYDROCHLORIDE 300 MILLIGRAM(S): 150 TABLET, EXTENDED RELEASE ORAL at 08:21

## 2020-01-20 RX ADMIN — Medication 0.5 MILLIGRAM(S): at 20:08

## 2020-01-20 RX ADMIN — Medication 50 MILLIGRAM(S): at 08:21

## 2020-01-21 RX ADMIN — ATORVASTATIN CALCIUM 20 MILLIGRAM(S): 80 TABLET, FILM COATED ORAL at 20:11

## 2020-01-21 RX ADMIN — Medication 0.5 MILLIGRAM(S): at 20:11

## 2020-01-21 RX ADMIN — POLYETHYLENE GLYCOL 3350 17 GRAM(S): 17 POWDER, FOR SOLUTION ORAL at 09:00

## 2020-01-21 RX ADMIN — LISINOPRIL 2.5 MILLIGRAM(S): 2.5 TABLET ORAL at 09:00

## 2020-01-21 RX ADMIN — BUPROPION HYDROCHLORIDE 300 MILLIGRAM(S): 150 TABLET, EXTENDED RELEASE ORAL at 09:00

## 2020-01-21 RX ADMIN — Medication 3 MILLIGRAM(S): at 20:11

## 2020-01-21 RX ADMIN — Medication 0.5 MILLIGRAM(S): at 13:52

## 2020-01-21 RX ADMIN — Medication 1 TABLET(S): at 09:00

## 2020-01-21 RX ADMIN — Medication 50 MILLIGRAM(S): at 09:00

## 2020-01-22 PROCEDURE — 90870 ELECTROCONVULSIVE THERAPY: CPT

## 2020-01-22 PROCEDURE — 99232 SBSQ HOSP IP/OBS MODERATE 35: CPT | Mod: GC,25

## 2020-01-22 RX ORDER — FLUMAZENIL 0.1 MG/ML
0.2 VIAL (ML) INTRAVENOUS ONCE
Refills: 0 | Status: COMPLETED | OUTPATIENT
Start: 2020-01-22 | End: 2020-01-22

## 2020-01-22 RX ADMIN — Medication 3 MILLIGRAM(S): at 20:10

## 2020-01-22 RX ADMIN — Medication 0.5 MILLIGRAM(S): at 13:39

## 2020-01-22 RX ADMIN — Medication 50 MILLIGRAM(S): at 13:39

## 2020-01-22 RX ADMIN — Medication 0.2 MILLIGRAM(S): at 11:51

## 2020-01-22 RX ADMIN — ATORVASTATIN CALCIUM 20 MILLIGRAM(S): 80 TABLET, FILM COATED ORAL at 20:10

## 2020-01-22 RX ADMIN — Medication 0.5 MILLIGRAM(S): at 20:10

## 2020-01-22 RX ADMIN — BUPROPION HYDROCHLORIDE 300 MILLIGRAM(S): 150 TABLET, EXTENDED RELEASE ORAL at 13:39

## 2020-01-22 RX ADMIN — Medication 0.5 MILLIGRAM(S): at 06:12

## 2020-01-22 RX ADMIN — LISINOPRIL 2.5 MILLIGRAM(S): 2.5 TABLET ORAL at 13:39

## 2020-01-22 RX ADMIN — Medication 1 TABLET(S): at 13:39

## 2020-01-23 PROCEDURE — 99232 SBSQ HOSP IP/OBS MODERATE 35: CPT | Mod: GC

## 2020-01-23 RX ADMIN — Medication 3 MILLIGRAM(S): at 20:36

## 2020-01-23 RX ADMIN — Medication 0.5 MILLIGRAM(S): at 13:40

## 2020-01-23 RX ADMIN — ATORVASTATIN CALCIUM 20 MILLIGRAM(S): 80 TABLET, FILM COATED ORAL at 20:36

## 2020-01-23 RX ADMIN — LISINOPRIL 2.5 MILLIGRAM(S): 2.5 TABLET ORAL at 10:18

## 2020-01-23 RX ADMIN — Medication 0.5 MILLIGRAM(S): at 20:36

## 2020-01-23 RX ADMIN — BUPROPION HYDROCHLORIDE 300 MILLIGRAM(S): 150 TABLET, EXTENDED RELEASE ORAL at 10:18

## 2020-01-23 RX ADMIN — Medication 0.5 MILLIGRAM(S): at 06:16

## 2020-01-23 RX ADMIN — Medication 50 MILLIGRAM(S): at 10:18

## 2020-01-23 RX ADMIN — Medication 1 TABLET(S): at 10:18

## 2020-01-24 PROCEDURE — 99232 SBSQ HOSP IP/OBS MODERATE 35: CPT | Mod: GC,25

## 2020-01-24 PROCEDURE — 90870 ELECTROCONVULSIVE THERAPY: CPT

## 2020-01-24 RX ORDER — FLUMAZENIL 0.1 MG/ML
0.2 VIAL (ML) INTRAVENOUS ONCE
Refills: 0 | Status: COMPLETED | OUTPATIENT
Start: 2020-01-24 | End: 2020-01-24

## 2020-01-24 RX ADMIN — Medication 0.2 MILLIGRAM(S): at 10:32

## 2020-01-24 RX ADMIN — Medication 0.5 MILLIGRAM(S): at 05:58

## 2020-01-24 RX ADMIN — Medication 3 MILLIGRAM(S): at 20:29

## 2020-01-24 RX ADMIN — Medication 0.5 MILLIGRAM(S): at 20:29

## 2020-01-24 RX ADMIN — Medication 0.5 MILLIGRAM(S): at 13:13

## 2020-01-24 RX ADMIN — ATORVASTATIN CALCIUM 20 MILLIGRAM(S): 80 TABLET, FILM COATED ORAL at 20:29

## 2020-01-25 RX ADMIN — Medication 1 TABLET(S): at 08:47

## 2020-01-25 RX ADMIN — Medication 0.5 MILLIGRAM(S): at 12:51

## 2020-01-25 RX ADMIN — POLYETHYLENE GLYCOL 3350 17 GRAM(S): 17 POWDER, FOR SOLUTION ORAL at 09:49

## 2020-01-25 RX ADMIN — BUPROPION HYDROCHLORIDE 300 MILLIGRAM(S): 150 TABLET, EXTENDED RELEASE ORAL at 08:47

## 2020-01-25 RX ADMIN — Medication 0.5 MILLIGRAM(S): at 06:49

## 2020-01-25 RX ADMIN — Medication 3 MILLIGRAM(S): at 20:08

## 2020-01-25 RX ADMIN — Medication 0.5 MILLIGRAM(S): at 20:07

## 2020-01-25 RX ADMIN — LISINOPRIL 2.5 MILLIGRAM(S): 2.5 TABLET ORAL at 08:46

## 2020-01-25 RX ADMIN — ATORVASTATIN CALCIUM 20 MILLIGRAM(S): 80 TABLET, FILM COATED ORAL at 20:08

## 2020-01-25 RX ADMIN — Medication 50 MILLIGRAM(S): at 08:47

## 2020-01-25 RX ADMIN — SENNA PLUS 2 TABLET(S): 8.6 TABLET ORAL at 09:49

## 2020-01-26 RX ADMIN — LISINOPRIL 2.5 MILLIGRAM(S): 2.5 TABLET ORAL at 08:41

## 2020-01-26 RX ADMIN — Medication 0.5 MILLIGRAM(S): at 12:41

## 2020-01-26 RX ADMIN — Medication 3 MILLIGRAM(S): at 20:13

## 2020-01-26 RX ADMIN — ATORVASTATIN CALCIUM 20 MILLIGRAM(S): 80 TABLET, FILM COATED ORAL at 20:13

## 2020-01-26 RX ADMIN — Medication 0.5 MILLIGRAM(S): at 20:13

## 2020-01-26 RX ADMIN — SENNA PLUS 2 TABLET(S): 8.6 TABLET ORAL at 08:41

## 2020-01-26 RX ADMIN — Medication 0.5 MILLIGRAM(S): at 06:09

## 2020-01-26 RX ADMIN — Medication 50 MILLIGRAM(S): at 08:41

## 2020-01-26 RX ADMIN — BUPROPION HYDROCHLORIDE 300 MILLIGRAM(S): 150 TABLET, EXTENDED RELEASE ORAL at 08:41

## 2020-01-26 RX ADMIN — Medication 1 TABLET(S): at 08:41

## 2020-01-27 PROCEDURE — 99232 SBSQ HOSP IP/OBS MODERATE 35: CPT | Mod: GC

## 2020-01-27 PROCEDURE — 90853 GROUP PSYCHOTHERAPY: CPT

## 2020-01-27 RX ADMIN — ATORVASTATIN CALCIUM 20 MILLIGRAM(S): 80 TABLET, FILM COATED ORAL at 20:39

## 2020-01-27 RX ADMIN — Medication 0.5 MILLIGRAM(S): at 12:18

## 2020-01-27 RX ADMIN — Medication 0.5 MILLIGRAM(S): at 06:03

## 2020-01-27 RX ADMIN — BUPROPION HYDROCHLORIDE 300 MILLIGRAM(S): 150 TABLET, EXTENDED RELEASE ORAL at 08:44

## 2020-01-27 RX ADMIN — Medication 3 MILLIGRAM(S): at 20:39

## 2020-01-27 RX ADMIN — Medication 50 MILLIGRAM(S): at 08:44

## 2020-01-27 RX ADMIN — Medication 0.5 MILLIGRAM(S): at 20:39

## 2020-01-27 RX ADMIN — LISINOPRIL 2.5 MILLIGRAM(S): 2.5 TABLET ORAL at 08:44

## 2020-01-27 RX ADMIN — Medication 1 TABLET(S): at 08:44

## 2020-01-28 PROCEDURE — 99232 SBSQ HOSP IP/OBS MODERATE 35: CPT | Mod: GC

## 2020-01-28 RX ADMIN — Medication 0.5 MILLIGRAM(S): at 14:03

## 2020-01-28 RX ADMIN — Medication 0.5 MILLIGRAM(S): at 05:42

## 2020-01-28 RX ADMIN — Medication 3 MILLIGRAM(S): at 20:37

## 2020-01-28 RX ADMIN — Medication 1 TABLET(S): at 08:29

## 2020-01-28 RX ADMIN — ATORVASTATIN CALCIUM 20 MILLIGRAM(S): 80 TABLET, FILM COATED ORAL at 20:37

## 2020-01-28 RX ADMIN — Medication 50 MILLIGRAM(S): at 08:30

## 2020-01-28 RX ADMIN — LISINOPRIL 2.5 MILLIGRAM(S): 2.5 TABLET ORAL at 08:30

## 2020-01-28 RX ADMIN — Medication 0.5 MILLIGRAM(S): at 20:37

## 2020-01-28 RX ADMIN — BUPROPION HYDROCHLORIDE 300 MILLIGRAM(S): 150 TABLET, EXTENDED RELEASE ORAL at 08:30

## 2020-01-29 PROCEDURE — 99232 SBSQ HOSP IP/OBS MODERATE 35: CPT | Mod: GC

## 2020-01-29 RX ADMIN — Medication 1 TABLET(S): at 11:57

## 2020-01-29 RX ADMIN — Medication 0.5 MILLIGRAM(S): at 20:33

## 2020-01-29 RX ADMIN — Medication 50 MILLIGRAM(S): at 08:19

## 2020-01-29 RX ADMIN — BUPROPION HYDROCHLORIDE 300 MILLIGRAM(S): 150 TABLET, EXTENDED RELEASE ORAL at 11:57

## 2020-01-29 RX ADMIN — Medication 0.5 MILLIGRAM(S): at 05:17

## 2020-01-29 RX ADMIN — Medication 0.5 MILLIGRAM(S): at 12:55

## 2020-01-29 RX ADMIN — Medication 3 MILLIGRAM(S): at 20:33

## 2020-01-29 RX ADMIN — ATORVASTATIN CALCIUM 20 MILLIGRAM(S): 80 TABLET, FILM COATED ORAL at 20:33

## 2020-01-29 RX ADMIN — LISINOPRIL 2.5 MILLIGRAM(S): 2.5 TABLET ORAL at 08:19

## 2020-01-30 PROCEDURE — 99232 SBSQ HOSP IP/OBS MODERATE 35: CPT | Mod: GC

## 2020-01-30 RX ADMIN — Medication 1 TABLET(S): at 07:59

## 2020-01-30 RX ADMIN — Medication 50 MILLIGRAM(S): at 07:59

## 2020-01-30 RX ADMIN — BUPROPION HYDROCHLORIDE 300 MILLIGRAM(S): 150 TABLET, EXTENDED RELEASE ORAL at 07:59

## 2020-01-30 RX ADMIN — ATORVASTATIN CALCIUM 20 MILLIGRAM(S): 80 TABLET, FILM COATED ORAL at 20:17

## 2020-01-30 RX ADMIN — POLYETHYLENE GLYCOL 3350 17 GRAM(S): 17 POWDER, FOR SOLUTION ORAL at 08:01

## 2020-01-30 RX ADMIN — Medication 0.5 MILLIGRAM(S): at 06:18

## 2020-01-30 RX ADMIN — LISINOPRIL 2.5 MILLIGRAM(S): 2.5 TABLET ORAL at 07:59

## 2020-01-30 RX ADMIN — Medication 0.5 MILLIGRAM(S): at 20:17

## 2020-01-30 RX ADMIN — Medication 0.5 MILLIGRAM(S): at 11:28

## 2020-01-30 RX ADMIN — Medication 3 MILLIGRAM(S): at 20:18

## 2020-01-31 PROCEDURE — 99232 SBSQ HOSP IP/OBS MODERATE 35: CPT | Mod: GC,25

## 2020-01-31 PROCEDURE — 90870 ELECTROCONVULSIVE THERAPY: CPT

## 2020-01-31 RX ORDER — FLUMAZENIL 0.1 MG/ML
0.2 VIAL (ML) INTRAVENOUS ONCE
Refills: 0 | Status: COMPLETED | OUTPATIENT
Start: 2020-01-31 | End: 2020-01-31

## 2020-01-31 RX ADMIN — Medication 0.2 MILLIGRAM(S): at 10:39

## 2020-01-31 RX ADMIN — Medication 3 MILLIGRAM(S): at 20:26

## 2020-01-31 RX ADMIN — Medication 0.5 MILLIGRAM(S): at 14:10

## 2020-01-31 RX ADMIN — Medication 0.5 MILLIGRAM(S): at 20:26

## 2020-01-31 RX ADMIN — ATORVASTATIN CALCIUM 20 MILLIGRAM(S): 80 TABLET, FILM COATED ORAL at 20:26

## 2020-01-31 RX ADMIN — Medication 0.5 MILLIGRAM(S): at 05:52

## 2020-02-01 RX ADMIN — BUPROPION HYDROCHLORIDE 300 MILLIGRAM(S): 150 TABLET, EXTENDED RELEASE ORAL at 08:00

## 2020-02-01 RX ADMIN — LISINOPRIL 2.5 MILLIGRAM(S): 2.5 TABLET ORAL at 08:00

## 2020-02-01 RX ADMIN — Medication 50 MILLIGRAM(S): at 08:00

## 2020-02-01 RX ADMIN — Medication 0.5 MILLIGRAM(S): at 12:45

## 2020-02-01 RX ADMIN — POLYETHYLENE GLYCOL 3350 17 GRAM(S): 17 POWDER, FOR SOLUTION ORAL at 17:01

## 2020-02-01 RX ADMIN — Medication 650 MILLIGRAM(S): at 09:18

## 2020-02-01 RX ADMIN — Medication 3 MILLIGRAM(S): at 20:20

## 2020-02-01 RX ADMIN — ATORVASTATIN CALCIUM 20 MILLIGRAM(S): 80 TABLET, FILM COATED ORAL at 20:20

## 2020-02-01 RX ADMIN — Medication 1 TABLET(S): at 08:00

## 2020-02-01 RX ADMIN — Medication 0.5 MILLIGRAM(S): at 06:18

## 2020-02-01 RX ADMIN — Medication 0.5 MILLIGRAM(S): at 20:19

## 2020-02-01 RX ADMIN — Medication 650 MILLIGRAM(S): at 08:01

## 2020-02-02 RX ADMIN — Medication 3 MILLIGRAM(S): at 20:32

## 2020-02-02 RX ADMIN — Medication 50 MILLIGRAM(S): at 08:46

## 2020-02-02 RX ADMIN — Medication 0.5 MILLIGRAM(S): at 06:35

## 2020-02-02 RX ADMIN — Medication 0.5 MILLIGRAM(S): at 19:29

## 2020-02-02 RX ADMIN — Medication 1 TABLET(S): at 08:46

## 2020-02-02 RX ADMIN — BUPROPION HYDROCHLORIDE 300 MILLIGRAM(S): 150 TABLET, EXTENDED RELEASE ORAL at 08:46

## 2020-02-02 RX ADMIN — Medication 0.5 MILLIGRAM(S): at 12:22

## 2020-02-02 RX ADMIN — LISINOPRIL 2.5 MILLIGRAM(S): 2.5 TABLET ORAL at 08:46

## 2020-02-02 RX ADMIN — ATORVASTATIN CALCIUM 20 MILLIGRAM(S): 80 TABLET, FILM COATED ORAL at 20:32

## 2020-02-03 PROCEDURE — 99232 SBSQ HOSP IP/OBS MODERATE 35: CPT

## 2020-02-03 RX ADMIN — Medication 3 MILLIGRAM(S): at 20:09

## 2020-02-03 RX ADMIN — Medication 0.5 MILLIGRAM(S): at 20:09

## 2020-02-03 RX ADMIN — ATORVASTATIN CALCIUM 20 MILLIGRAM(S): 80 TABLET, FILM COATED ORAL at 20:09

## 2020-02-03 RX ADMIN — LISINOPRIL 2.5 MILLIGRAM(S): 2.5 TABLET ORAL at 10:03

## 2020-02-03 RX ADMIN — Medication 0.5 MILLIGRAM(S): at 14:02

## 2020-02-03 RX ADMIN — Medication 1 TABLET(S): at 10:03

## 2020-02-03 RX ADMIN — Medication 0.5 MILLIGRAM(S): at 06:43

## 2020-02-03 RX ADMIN — BUPROPION HYDROCHLORIDE 300 MILLIGRAM(S): 150 TABLET, EXTENDED RELEASE ORAL at 10:03

## 2020-02-03 RX ADMIN — Medication 50 MILLIGRAM(S): at 10:03

## 2020-02-04 PROCEDURE — 99232 SBSQ HOSP IP/OBS MODERATE 35: CPT

## 2020-02-04 RX ADMIN — Medication 0.5 MILLIGRAM(S): at 13:06

## 2020-02-04 RX ADMIN — ATORVASTATIN CALCIUM 20 MILLIGRAM(S): 80 TABLET, FILM COATED ORAL at 20:54

## 2020-02-04 RX ADMIN — Medication 650 MILLIGRAM(S): at 17:00

## 2020-02-04 RX ADMIN — LISINOPRIL 2.5 MILLIGRAM(S): 2.5 TABLET ORAL at 08:46

## 2020-02-04 RX ADMIN — Medication 1 TABLET(S): at 08:46

## 2020-02-04 RX ADMIN — Medication 50 MILLIGRAM(S): at 08:46

## 2020-02-04 RX ADMIN — Medication 0.5 MILLIGRAM(S): at 06:13

## 2020-02-04 RX ADMIN — Medication 650 MILLIGRAM(S): at 18:00

## 2020-02-04 RX ADMIN — BUPROPION HYDROCHLORIDE 300 MILLIGRAM(S): 150 TABLET, EXTENDED RELEASE ORAL at 08:46

## 2020-02-04 RX ADMIN — Medication 0.5 MILLIGRAM(S): at 20:54

## 2020-02-04 RX ADMIN — Medication 3 MILLIGRAM(S): at 20:54

## 2020-02-05 PROCEDURE — 90870 ELECTROCONVULSIVE THERAPY: CPT

## 2020-02-05 PROCEDURE — 99232 SBSQ HOSP IP/OBS MODERATE 35: CPT | Mod: 25

## 2020-02-05 RX ORDER — FLUMAZENIL 0.1 MG/ML
0.2 VIAL (ML) INTRAVENOUS ONCE
Refills: 0 | Status: COMPLETED | OUTPATIENT
Start: 2020-02-05 | End: 2020-02-05

## 2020-02-05 RX ADMIN — Medication 0.5 MILLIGRAM(S): at 06:26

## 2020-02-05 RX ADMIN — Medication 0.2 MILLIGRAM(S): at 10:38

## 2020-02-05 RX ADMIN — Medication 0.5 MILLIGRAM(S): at 20:13

## 2020-02-05 RX ADMIN — ATORVASTATIN CALCIUM 20 MILLIGRAM(S): 80 TABLET, FILM COATED ORAL at 20:13

## 2020-02-05 RX ADMIN — Medication 0.5 MILLIGRAM(S): at 13:00

## 2020-02-05 RX ADMIN — Medication 3 MILLIGRAM(S): at 20:13

## 2020-02-06 PROCEDURE — 99232 SBSQ HOSP IP/OBS MODERATE 35: CPT

## 2020-02-06 RX ADMIN — LISINOPRIL 2.5 MILLIGRAM(S): 2.5 TABLET ORAL at 08:25

## 2020-02-06 RX ADMIN — BUPROPION HYDROCHLORIDE 300 MILLIGRAM(S): 150 TABLET, EXTENDED RELEASE ORAL at 08:25

## 2020-02-06 RX ADMIN — Medication 0.5 MILLIGRAM(S): at 15:17

## 2020-02-06 RX ADMIN — Medication 1 TABLET(S): at 08:28

## 2020-02-06 RX ADMIN — Medication 50 MILLIGRAM(S): at 08:27

## 2020-02-06 RX ADMIN — Medication 0.5 MILLIGRAM(S): at 20:09

## 2020-02-06 RX ADMIN — ATORVASTATIN CALCIUM 20 MILLIGRAM(S): 80 TABLET, FILM COATED ORAL at 20:09

## 2020-02-06 RX ADMIN — Medication 3 MILLIGRAM(S): at 20:09

## 2020-02-07 PROCEDURE — 99232 SBSQ HOSP IP/OBS MODERATE 35: CPT

## 2020-02-07 RX ADMIN — Medication 0.5 MILLIGRAM(S): at 12:27

## 2020-02-07 RX ADMIN — Medication 0.5 MILLIGRAM(S): at 08:12

## 2020-02-07 RX ADMIN — LISINOPRIL 2.5 MILLIGRAM(S): 2.5 TABLET ORAL at 08:12

## 2020-02-07 RX ADMIN — ATORVASTATIN CALCIUM 20 MILLIGRAM(S): 80 TABLET, FILM COATED ORAL at 20:15

## 2020-02-07 RX ADMIN — Medication 50 MILLIGRAM(S): at 08:13

## 2020-02-07 RX ADMIN — Medication 0.5 MILLIGRAM(S): at 20:15

## 2020-02-07 RX ADMIN — Medication 1 TABLET(S): at 08:13

## 2020-02-07 RX ADMIN — BUPROPION HYDROCHLORIDE 300 MILLIGRAM(S): 150 TABLET, EXTENDED RELEASE ORAL at 08:12

## 2020-02-07 RX ADMIN — Medication 3 MILLIGRAM(S): at 20:15

## 2020-02-08 RX ADMIN — POLYETHYLENE GLYCOL 3350 17 GRAM(S): 17 POWDER, FOR SOLUTION ORAL at 10:00

## 2020-02-08 RX ADMIN — BUPROPION HYDROCHLORIDE 300 MILLIGRAM(S): 150 TABLET, EXTENDED RELEASE ORAL at 09:54

## 2020-02-08 RX ADMIN — ATORVASTATIN CALCIUM 20 MILLIGRAM(S): 80 TABLET, FILM COATED ORAL at 20:37

## 2020-02-08 RX ADMIN — Medication 3 MILLIGRAM(S): at 20:37

## 2020-02-08 RX ADMIN — Medication 1 TABLET(S): at 09:54

## 2020-02-08 RX ADMIN — Medication 0.5 MILLIGRAM(S): at 13:15

## 2020-02-08 RX ADMIN — Medication 0.5 MILLIGRAM(S): at 20:25

## 2020-02-08 RX ADMIN — LISINOPRIL 2.5 MILLIGRAM(S): 2.5 TABLET ORAL at 09:54

## 2020-02-08 RX ADMIN — Medication 50 MILLIGRAM(S): at 09:54

## 2020-02-09 RX ADMIN — Medication 1 TABLET(S): at 09:12

## 2020-02-09 RX ADMIN — BUPROPION HYDROCHLORIDE 300 MILLIGRAM(S): 150 TABLET, EXTENDED RELEASE ORAL at 09:12

## 2020-02-09 RX ADMIN — Medication 0.5 MILLIGRAM(S): at 12:39

## 2020-02-09 RX ADMIN — Medication 0.5 MILLIGRAM(S): at 20:15

## 2020-02-09 RX ADMIN — Medication 0.5 MILLIGRAM(S): at 06:12

## 2020-02-09 RX ADMIN — Medication 3 MILLIGRAM(S): at 20:31

## 2020-02-09 RX ADMIN — LISINOPRIL 2.5 MILLIGRAM(S): 2.5 TABLET ORAL at 09:12

## 2020-02-09 RX ADMIN — ATORVASTATIN CALCIUM 20 MILLIGRAM(S): 80 TABLET, FILM COATED ORAL at 20:31

## 2020-02-09 RX ADMIN — Medication 50 MILLIGRAM(S): at 09:12

## 2020-02-10 PROCEDURE — 99232 SBSQ HOSP IP/OBS MODERATE 35: CPT

## 2020-02-10 RX ADMIN — Medication 3 MILLIGRAM(S): at 20:25

## 2020-02-10 RX ADMIN — Medication 0.5 MILLIGRAM(S): at 20:25

## 2020-02-10 RX ADMIN — ATORVASTATIN CALCIUM 20 MILLIGRAM(S): 80 TABLET, FILM COATED ORAL at 20:25

## 2020-02-10 RX ADMIN — Medication 1 TABLET(S): at 09:02

## 2020-02-10 RX ADMIN — Medication 50 MILLIGRAM(S): at 09:02

## 2020-02-10 RX ADMIN — LISINOPRIL 2.5 MILLIGRAM(S): 2.5 TABLET ORAL at 09:02

## 2020-02-10 RX ADMIN — Medication 0.5 MILLIGRAM(S): at 06:22

## 2020-02-10 RX ADMIN — BUPROPION HYDROCHLORIDE 300 MILLIGRAM(S): 150 TABLET, EXTENDED RELEASE ORAL at 09:02

## 2020-02-11 PROCEDURE — 99232 SBSQ HOSP IP/OBS MODERATE 35: CPT

## 2020-02-11 RX ORDER — RISPERIDONE 4 MG/1
0.25 TABLET ORAL AT BEDTIME
Refills: 0 | Status: DISCONTINUED | OUTPATIENT
Start: 2020-02-11 | End: 2020-02-14

## 2020-02-11 RX ADMIN — LISINOPRIL 2.5 MILLIGRAM(S): 2.5 TABLET ORAL at 08:17

## 2020-02-11 RX ADMIN — Medication 3 MILLIGRAM(S): at 20:31

## 2020-02-11 RX ADMIN — Medication 50 MILLIGRAM(S): at 08:17

## 2020-02-11 RX ADMIN — BUPROPION HYDROCHLORIDE 300 MILLIGRAM(S): 150 TABLET, EXTENDED RELEASE ORAL at 08:17

## 2020-02-11 RX ADMIN — Medication 1 TABLET(S): at 08:17

## 2020-02-11 RX ADMIN — RISPERIDONE 0.25 MILLIGRAM(S): 4 TABLET ORAL at 20:31

## 2020-02-11 RX ADMIN — Medication 0.5 MILLIGRAM(S): at 06:03

## 2020-02-11 RX ADMIN — ATORVASTATIN CALCIUM 20 MILLIGRAM(S): 80 TABLET, FILM COATED ORAL at 20:31

## 2020-02-11 RX ADMIN — Medication 0.5 MILLIGRAM(S): at 20:31

## 2020-02-11 RX ADMIN — Medication 0.5 MILLIGRAM(S): at 12:33

## 2020-02-12 PROCEDURE — 99232 SBSQ HOSP IP/OBS MODERATE 35: CPT

## 2020-02-12 RX ADMIN — Medication 0.5 MILLIGRAM(S): at 20:44

## 2020-02-12 RX ADMIN — ATORVASTATIN CALCIUM 20 MILLIGRAM(S): 80 TABLET, FILM COATED ORAL at 20:44

## 2020-02-12 RX ADMIN — Medication 3 MILLIGRAM(S): at 20:44

## 2020-02-12 RX ADMIN — RISPERIDONE 0.25 MILLIGRAM(S): 4 TABLET ORAL at 20:44

## 2020-02-12 RX ADMIN — Medication 0.5 MILLIGRAM(S): at 06:15

## 2020-02-12 RX ADMIN — Medication 50 MILLIGRAM(S): at 09:37

## 2020-02-12 RX ADMIN — Medication 1 TABLET(S): at 09:37

## 2020-02-12 RX ADMIN — LISINOPRIL 2.5 MILLIGRAM(S): 2.5 TABLET ORAL at 09:37

## 2020-02-12 RX ADMIN — Medication 0.5 MILLIGRAM(S): at 12:52

## 2020-02-12 RX ADMIN — BUPROPION HYDROCHLORIDE 300 MILLIGRAM(S): 150 TABLET, EXTENDED RELEASE ORAL at 09:37

## 2020-02-13 PROCEDURE — 99232 SBSQ HOSP IP/OBS MODERATE 35: CPT

## 2020-02-13 RX ADMIN — RISPERIDONE 0.25 MILLIGRAM(S): 4 TABLET ORAL at 20:22

## 2020-02-13 RX ADMIN — Medication 0.5 MILLIGRAM(S): at 20:08

## 2020-02-13 RX ADMIN — LISINOPRIL 2.5 MILLIGRAM(S): 2.5 TABLET ORAL at 08:55

## 2020-02-13 RX ADMIN — Medication 3 MILLIGRAM(S): at 20:22

## 2020-02-13 RX ADMIN — Medication 650 MILLIGRAM(S): at 18:02

## 2020-02-13 RX ADMIN — BUPROPION HYDROCHLORIDE 300 MILLIGRAM(S): 150 TABLET, EXTENDED RELEASE ORAL at 08:55

## 2020-02-13 RX ADMIN — ATORVASTATIN CALCIUM 20 MILLIGRAM(S): 80 TABLET, FILM COATED ORAL at 20:22

## 2020-02-13 RX ADMIN — Medication 650 MILLIGRAM(S): at 17:45

## 2020-02-13 RX ADMIN — Medication 1 TABLET(S): at 08:55

## 2020-02-13 RX ADMIN — Medication 0.5 MILLIGRAM(S): at 06:09

## 2020-02-13 RX ADMIN — Medication 50 MILLIGRAM(S): at 08:56

## 2020-02-13 RX ADMIN — Medication 0.5 MILLIGRAM(S): at 12:29

## 2020-02-14 PROCEDURE — 99232 SBSQ HOSP IP/OBS MODERATE 35: CPT

## 2020-02-14 RX ORDER — RISPERIDONE 4 MG/1
0.5 TABLET ORAL AT BEDTIME
Refills: 0 | Status: DISCONTINUED | OUTPATIENT
Start: 2020-02-14 | End: 2020-02-21

## 2020-02-14 RX ADMIN — Medication 1 TABLET(S): at 08:48

## 2020-02-14 RX ADMIN — LISINOPRIL 2.5 MILLIGRAM(S): 2.5 TABLET ORAL at 08:48

## 2020-02-14 RX ADMIN — RISPERIDONE 0.5 MILLIGRAM(S): 4 TABLET ORAL at 20:31

## 2020-02-14 RX ADMIN — ATORVASTATIN CALCIUM 20 MILLIGRAM(S): 80 TABLET, FILM COATED ORAL at 20:31

## 2020-02-14 RX ADMIN — Medication 3 MILLIGRAM(S): at 20:31

## 2020-02-14 RX ADMIN — Medication 50 MILLIGRAM(S): at 08:48

## 2020-02-14 RX ADMIN — BUPROPION HYDROCHLORIDE 300 MILLIGRAM(S): 150 TABLET, EXTENDED RELEASE ORAL at 08:48

## 2020-02-15 RX ADMIN — Medication 3 MILLIGRAM(S): at 20:39

## 2020-02-15 RX ADMIN — RISPERIDONE 0.5 MILLIGRAM(S): 4 TABLET ORAL at 20:39

## 2020-02-15 RX ADMIN — Medication 0.5 MILLIGRAM(S): at 20:39

## 2020-02-15 RX ADMIN — Medication 1 TABLET(S): at 08:33

## 2020-02-15 RX ADMIN — ATORVASTATIN CALCIUM 20 MILLIGRAM(S): 80 TABLET, FILM COATED ORAL at 20:39

## 2020-02-15 RX ADMIN — Medication 50 MILLIGRAM(S): at 08:33

## 2020-02-15 RX ADMIN — POLYETHYLENE GLYCOL 3350 17 GRAM(S): 17 POWDER, FOR SOLUTION ORAL at 08:33

## 2020-02-15 RX ADMIN — BUPROPION HYDROCHLORIDE 300 MILLIGRAM(S): 150 TABLET, EXTENDED RELEASE ORAL at 08:32

## 2020-02-15 RX ADMIN — LISINOPRIL 2.5 MILLIGRAM(S): 2.5 TABLET ORAL at 08:32

## 2020-02-16 RX ADMIN — Medication 0.5 MILLIGRAM(S): at 12:48

## 2020-02-16 RX ADMIN — BUPROPION HYDROCHLORIDE 300 MILLIGRAM(S): 150 TABLET, EXTENDED RELEASE ORAL at 08:44

## 2020-02-16 RX ADMIN — Medication 0.5 MILLIGRAM(S): at 06:22

## 2020-02-16 RX ADMIN — LISINOPRIL 2.5 MILLIGRAM(S): 2.5 TABLET ORAL at 08:44

## 2020-02-16 RX ADMIN — Medication 3 MILLIGRAM(S): at 20:17

## 2020-02-16 RX ADMIN — Medication 0.5 MILLIGRAM(S): at 20:17

## 2020-02-16 RX ADMIN — ATORVASTATIN CALCIUM 20 MILLIGRAM(S): 80 TABLET, FILM COATED ORAL at 20:17

## 2020-02-16 RX ADMIN — Medication 1 TABLET(S): at 08:44

## 2020-02-16 RX ADMIN — Medication 50 MILLIGRAM(S): at 08:44

## 2020-02-16 RX ADMIN — RISPERIDONE 0.5 MILLIGRAM(S): 4 TABLET ORAL at 20:17

## 2020-02-17 RX ADMIN — Medication 0.5 MILLIGRAM(S): at 06:12

## 2020-02-17 RX ADMIN — ATORVASTATIN CALCIUM 20 MILLIGRAM(S): 80 TABLET, FILM COATED ORAL at 20:16

## 2020-02-17 RX ADMIN — Medication 3 MILLIGRAM(S): at 20:16

## 2020-02-17 RX ADMIN — Medication 0.5 MILLIGRAM(S): at 13:58

## 2020-02-17 RX ADMIN — RISPERIDONE 0.5 MILLIGRAM(S): 4 TABLET ORAL at 20:16

## 2020-02-17 RX ADMIN — Medication 0.5 MILLIGRAM(S): at 20:16

## 2020-02-17 RX ADMIN — LISINOPRIL 2.5 MILLIGRAM(S): 2.5 TABLET ORAL at 08:56

## 2020-02-17 RX ADMIN — BUPROPION HYDROCHLORIDE 300 MILLIGRAM(S): 150 TABLET, EXTENDED RELEASE ORAL at 08:56

## 2020-02-17 RX ADMIN — Medication 1 TABLET(S): at 08:56

## 2020-02-17 RX ADMIN — Medication 50 MILLIGRAM(S): at 08:56

## 2020-02-18 RX ORDER — METOPROLOL TARTRATE 50 MG
50 TABLET ORAL DAILY
Refills: 0 | Status: DISCONTINUED | OUTPATIENT
Start: 2020-02-18 | End: 2020-03-02

## 2020-02-18 RX ORDER — LISINOPRIL 2.5 MG/1
2.5 TABLET ORAL DAILY
Refills: 0 | Status: DISCONTINUED | OUTPATIENT
Start: 2020-02-18 | End: 2020-02-21

## 2020-02-18 RX ADMIN — RISPERIDONE 0.5 MILLIGRAM(S): 4 TABLET ORAL at 20:10

## 2020-02-18 RX ADMIN — ATORVASTATIN CALCIUM 20 MILLIGRAM(S): 80 TABLET, FILM COATED ORAL at 20:10

## 2020-02-18 RX ADMIN — Medication 0.5 MILLIGRAM(S): at 20:08

## 2020-02-18 RX ADMIN — Medication 3 MILLIGRAM(S): at 20:10

## 2020-02-18 RX ADMIN — Medication 0.5 MILLIGRAM(S): at 13:56

## 2020-02-18 RX ADMIN — Medication 1 TABLET(S): at 09:51

## 2020-02-18 RX ADMIN — BUPROPION HYDROCHLORIDE 300 MILLIGRAM(S): 150 TABLET, EXTENDED RELEASE ORAL at 09:51

## 2020-02-18 RX ADMIN — Medication 0.5 MILLIGRAM(S): at 05:34

## 2020-02-19 PROCEDURE — 90853 GROUP PSYCHOTHERAPY: CPT

## 2020-02-19 PROCEDURE — 99232 SBSQ HOSP IP/OBS MODERATE 35: CPT

## 2020-02-19 RX ADMIN — LISINOPRIL 2.5 MILLIGRAM(S): 2.5 TABLET ORAL at 09:43

## 2020-02-19 RX ADMIN — BUPROPION HYDROCHLORIDE 300 MILLIGRAM(S): 150 TABLET, EXTENDED RELEASE ORAL at 09:43

## 2020-02-19 RX ADMIN — Medication 50 MILLIGRAM(S): at 09:42

## 2020-02-19 RX ADMIN — RISPERIDONE 0.5 MILLIGRAM(S): 4 TABLET ORAL at 21:09

## 2020-02-19 RX ADMIN — Medication 0.5 MILLIGRAM(S): at 21:09

## 2020-02-19 RX ADMIN — ATORVASTATIN CALCIUM 20 MILLIGRAM(S): 80 TABLET, FILM COATED ORAL at 21:09

## 2020-02-19 RX ADMIN — Medication 0.5 MILLIGRAM(S): at 09:42

## 2020-02-19 RX ADMIN — Medication 3 MILLIGRAM(S): at 21:09

## 2020-02-20 PROCEDURE — 99232 SBSQ HOSP IP/OBS MODERATE 35: CPT

## 2020-02-20 RX ADMIN — LISINOPRIL 2.5 MILLIGRAM(S): 2.5 TABLET ORAL at 09:32

## 2020-02-20 RX ADMIN — Medication 3 MILLIGRAM(S): at 20:41

## 2020-02-20 RX ADMIN — RISPERIDONE 0.5 MILLIGRAM(S): 4 TABLET ORAL at 20:41

## 2020-02-20 RX ADMIN — ATORVASTATIN CALCIUM 20 MILLIGRAM(S): 80 TABLET, FILM COATED ORAL at 20:41

## 2020-02-20 RX ADMIN — BUPROPION HYDROCHLORIDE 300 MILLIGRAM(S): 150 TABLET, EXTENDED RELEASE ORAL at 09:32

## 2020-02-20 RX ADMIN — Medication 1 TABLET(S): at 09:32

## 2020-02-20 RX ADMIN — Medication 0.5 MILLIGRAM(S): at 09:32

## 2020-02-21 LAB
ANION GAP SERPL CALC-SCNC: 12 MMO/L — SIGNIFICANT CHANGE UP (ref 7–14)
BASOPHILS # BLD AUTO: 0.05 K/UL — SIGNIFICANT CHANGE UP (ref 0–0.2)
BASOPHILS NFR BLD AUTO: 0.8 % — SIGNIFICANT CHANGE UP (ref 0–2)
BUN SERPL-MCNC: 19 MG/DL — SIGNIFICANT CHANGE UP (ref 7–23)
CALCIUM SERPL-MCNC: 9.5 MG/DL — SIGNIFICANT CHANGE UP (ref 8.4–10.5)
CHLORIDE SERPL-SCNC: 105 MMOL/L — SIGNIFICANT CHANGE UP (ref 98–107)
CO2 SERPL-SCNC: 24 MMOL/L — SIGNIFICANT CHANGE UP (ref 22–31)
CREAT SERPL-MCNC: 0.81 MG/DL — SIGNIFICANT CHANGE UP (ref 0.5–1.3)
EOSINOPHIL # BLD AUTO: 0.15 K/UL — SIGNIFICANT CHANGE UP (ref 0–0.5)
EOSINOPHIL NFR BLD AUTO: 2.4 % — SIGNIFICANT CHANGE UP (ref 0–6)
GLUCOSE SERPL-MCNC: 88 MG/DL — SIGNIFICANT CHANGE UP (ref 70–99)
HCT VFR BLD CALC: 45.9 % — HIGH (ref 34.5–45)
HGB BLD-MCNC: 14.1 G/DL — SIGNIFICANT CHANGE UP (ref 11.5–15.5)
IMM GRANULOCYTES NFR BLD AUTO: 0.3 % — SIGNIFICANT CHANGE UP (ref 0–1.5)
LYMPHOCYTES # BLD AUTO: 1.56 K/UL — SIGNIFICANT CHANGE UP (ref 1–3.3)
LYMPHOCYTES # BLD AUTO: 24.5 % — SIGNIFICANT CHANGE UP (ref 13–44)
MCHC RBC-ENTMCNC: 29.7 PG — SIGNIFICANT CHANGE UP (ref 27–34)
MCHC RBC-ENTMCNC: 30.7 % — LOW (ref 32–36)
MCV RBC AUTO: 96.6 FL — SIGNIFICANT CHANGE UP (ref 80–100)
MONOCYTES # BLD AUTO: 0.66 K/UL — SIGNIFICANT CHANGE UP (ref 0–0.9)
MONOCYTES NFR BLD AUTO: 10.4 % — SIGNIFICANT CHANGE UP (ref 2–14)
NEUTROPHILS # BLD AUTO: 3.92 K/UL — SIGNIFICANT CHANGE UP (ref 1.8–7.4)
NEUTROPHILS NFR BLD AUTO: 61.6 % — SIGNIFICANT CHANGE UP (ref 43–77)
NRBC # FLD: 0 K/UL — SIGNIFICANT CHANGE UP (ref 0–0)
PLATELET # BLD AUTO: 285 K/UL — SIGNIFICANT CHANGE UP (ref 150–400)
PMV BLD: 9.5 FL — SIGNIFICANT CHANGE UP (ref 7–13)
POTASSIUM SERPL-MCNC: 4.4 MMOL/L — SIGNIFICANT CHANGE UP (ref 3.5–5.3)
POTASSIUM SERPL-SCNC: 4.4 MMOL/L — SIGNIFICANT CHANGE UP (ref 3.5–5.3)
RBC # BLD: 4.75 M/UL — SIGNIFICANT CHANGE UP (ref 3.8–5.2)
RBC # FLD: 13.2 % — SIGNIFICANT CHANGE UP (ref 10.3–14.5)
SODIUM SERPL-SCNC: 141 MMOL/L — SIGNIFICANT CHANGE UP (ref 135–145)
WBC # BLD: 6.36 K/UL — SIGNIFICANT CHANGE UP (ref 3.8–10.5)
WBC # FLD AUTO: 6.36 K/UL — SIGNIFICANT CHANGE UP (ref 3.8–10.5)

## 2020-02-21 PROCEDURE — 99232 SBSQ HOSP IP/OBS MODERATE 35: CPT

## 2020-02-21 RX ORDER — RISPERIDONE 4 MG/1
0.25 TABLET ORAL AT BEDTIME
Refills: 0 | Status: DISCONTINUED | OUTPATIENT
Start: 2020-02-21 | End: 2020-03-02

## 2020-02-21 RX ADMIN — Medication 3 MILLIGRAM(S): at 21:50

## 2020-02-21 RX ADMIN — Medication 0.5 MILLIGRAM(S): at 09:10

## 2020-02-21 RX ADMIN — ATORVASTATIN CALCIUM 20 MILLIGRAM(S): 80 TABLET, FILM COATED ORAL at 21:50

## 2020-02-21 RX ADMIN — Medication 1 TABLET(S): at 09:16

## 2020-02-21 RX ADMIN — RISPERIDONE 0.25 MILLIGRAM(S): 4 TABLET ORAL at 21:50

## 2020-02-21 RX ADMIN — BUPROPION HYDROCHLORIDE 300 MILLIGRAM(S): 150 TABLET, EXTENDED RELEASE ORAL at 09:16

## 2020-02-22 RX ADMIN — Medication 3 MILLIGRAM(S): at 20:09

## 2020-02-22 RX ADMIN — RISPERIDONE 0.25 MILLIGRAM(S): 4 TABLET ORAL at 20:09

## 2020-02-22 RX ADMIN — Medication 50 MILLIGRAM(S): at 08:42

## 2020-02-22 RX ADMIN — BUPROPION HYDROCHLORIDE 300 MILLIGRAM(S): 150 TABLET, EXTENDED RELEASE ORAL at 08:41

## 2020-02-22 RX ADMIN — Medication 0.5 MILLIGRAM(S): at 08:41

## 2020-02-22 RX ADMIN — ATORVASTATIN CALCIUM 20 MILLIGRAM(S): 80 TABLET, FILM COATED ORAL at 20:09

## 2020-02-22 RX ADMIN — Medication 1 TABLET(S): at 08:41

## 2020-02-23 RX ADMIN — BUPROPION HYDROCHLORIDE 300 MILLIGRAM(S): 150 TABLET, EXTENDED RELEASE ORAL at 08:44

## 2020-02-23 RX ADMIN — Medication 3 MILLIGRAM(S): at 20:26

## 2020-02-23 RX ADMIN — Medication 1 TABLET(S): at 08:44

## 2020-02-23 RX ADMIN — Medication 0.5 MILLIGRAM(S): at 08:44

## 2020-02-23 RX ADMIN — Medication 50 MILLIGRAM(S): at 08:44

## 2020-02-23 RX ADMIN — RISPERIDONE 0.25 MILLIGRAM(S): 4 TABLET ORAL at 20:26

## 2020-02-23 RX ADMIN — ATORVASTATIN CALCIUM 20 MILLIGRAM(S): 80 TABLET, FILM COATED ORAL at 20:26

## 2020-02-24 PROCEDURE — 99232 SBSQ HOSP IP/OBS MODERATE 35: CPT

## 2020-02-24 RX ADMIN — Medication 1 TABLET(S): at 09:14

## 2020-02-24 RX ADMIN — ATORVASTATIN CALCIUM 20 MILLIGRAM(S): 80 TABLET, FILM COATED ORAL at 21:02

## 2020-02-24 RX ADMIN — Medication 3 MILLIGRAM(S): at 21:02

## 2020-02-24 RX ADMIN — BUPROPION HYDROCHLORIDE 300 MILLIGRAM(S): 150 TABLET, EXTENDED RELEASE ORAL at 09:14

## 2020-02-24 RX ADMIN — RISPERIDONE 0.25 MILLIGRAM(S): 4 TABLET ORAL at 21:02

## 2020-02-24 RX ADMIN — Medication 0.5 MILLIGRAM(S): at 09:14

## 2020-02-24 RX ADMIN — Medication 50 MILLIGRAM(S): at 09:14

## 2020-02-25 PROCEDURE — 99232 SBSQ HOSP IP/OBS MODERATE 35: CPT

## 2020-02-25 RX ADMIN — Medication 1 TABLET(S): at 08:57

## 2020-02-25 RX ADMIN — Medication 50 MILLIGRAM(S): at 08:57

## 2020-02-25 RX ADMIN — BUPROPION HYDROCHLORIDE 300 MILLIGRAM(S): 150 TABLET, EXTENDED RELEASE ORAL at 08:57

## 2020-02-25 RX ADMIN — ATORVASTATIN CALCIUM 20 MILLIGRAM(S): 80 TABLET, FILM COATED ORAL at 21:06

## 2020-02-25 RX ADMIN — RISPERIDONE 0.25 MILLIGRAM(S): 4 TABLET ORAL at 21:06

## 2020-02-25 RX ADMIN — Medication 3 MILLIGRAM(S): at 21:06

## 2020-02-25 RX ADMIN — Medication 0.5 MILLIGRAM(S): at 08:57

## 2020-02-26 PROCEDURE — 90853 GROUP PSYCHOTHERAPY: CPT

## 2020-02-26 PROCEDURE — 99232 SBSQ HOSP IP/OBS MODERATE 35: CPT

## 2020-02-26 RX ADMIN — RISPERIDONE 0.25 MILLIGRAM(S): 4 TABLET ORAL at 21:03

## 2020-02-26 RX ADMIN — ATORVASTATIN CALCIUM 20 MILLIGRAM(S): 80 TABLET, FILM COATED ORAL at 21:03

## 2020-02-26 RX ADMIN — Medication 50 MILLIGRAM(S): at 09:06

## 2020-02-26 RX ADMIN — Medication 1 TABLET(S): at 09:06

## 2020-02-26 RX ADMIN — POLYETHYLENE GLYCOL 3350 17 GRAM(S): 17 POWDER, FOR SOLUTION ORAL at 09:06

## 2020-02-26 RX ADMIN — Medication 0.5 MILLIGRAM(S): at 09:06

## 2020-02-26 RX ADMIN — Medication 3 MILLIGRAM(S): at 21:03

## 2020-02-26 RX ADMIN — BUPROPION HYDROCHLORIDE 300 MILLIGRAM(S): 150 TABLET, EXTENDED RELEASE ORAL at 09:06

## 2020-02-27 PROCEDURE — 99232 SBSQ HOSP IP/OBS MODERATE 35: CPT

## 2020-02-27 RX ADMIN — RISPERIDONE 0.25 MILLIGRAM(S): 4 TABLET ORAL at 20:59

## 2020-02-27 RX ADMIN — Medication 0.5 MILLIGRAM(S): at 08:24

## 2020-02-27 RX ADMIN — BUPROPION HYDROCHLORIDE 300 MILLIGRAM(S): 150 TABLET, EXTENDED RELEASE ORAL at 08:24

## 2020-02-27 RX ADMIN — Medication 50 MILLIGRAM(S): at 08:24

## 2020-02-27 RX ADMIN — Medication 3 MILLIGRAM(S): at 20:58

## 2020-02-27 RX ADMIN — ATORVASTATIN CALCIUM 20 MILLIGRAM(S): 80 TABLET, FILM COATED ORAL at 20:58

## 2020-02-27 RX ADMIN — Medication 1 TABLET(S): at 08:24

## 2020-02-28 PROCEDURE — 99232 SBSQ HOSP IP/OBS MODERATE 35: CPT

## 2020-02-28 RX ORDER — BUPROPION HYDROCHLORIDE 150 MG/1
1 TABLET, EXTENDED RELEASE ORAL
Qty: 0 | Refills: 0 | DISCHARGE
Start: 2020-02-28

## 2020-02-28 RX ORDER — ATORVASTATIN CALCIUM 80 MG/1
1 TABLET, FILM COATED ORAL
Qty: 0 | Refills: 0 | DISCHARGE
Start: 2020-02-28

## 2020-02-28 RX ORDER — METOPROLOL TARTRATE 50 MG
1 TABLET ORAL
Qty: 0 | Refills: 0 | DISCHARGE
Start: 2020-02-28

## 2020-02-28 RX ORDER — RISPERIDONE 4 MG/1
1 TABLET ORAL
Qty: 0 | Refills: 0 | DISCHARGE
Start: 2020-02-28

## 2020-02-28 RX ORDER — LANOLIN ALCOHOL/MO/W.PET/CERES
1 CREAM (GRAM) TOPICAL
Qty: 0 | Refills: 0 | DISCHARGE
Start: 2020-02-28

## 2020-02-28 RX ADMIN — BUPROPION HYDROCHLORIDE 300 MILLIGRAM(S): 150 TABLET, EXTENDED RELEASE ORAL at 08:43

## 2020-02-28 RX ADMIN — Medication 1 TABLET(S): at 08:43

## 2020-02-28 RX ADMIN — POLYETHYLENE GLYCOL 3350 17 GRAM(S): 17 POWDER, FOR SOLUTION ORAL at 09:16

## 2020-02-28 RX ADMIN — Medication 50 MILLIGRAM(S): at 08:43

## 2020-02-28 RX ADMIN — ATORVASTATIN CALCIUM 20 MILLIGRAM(S): 80 TABLET, FILM COATED ORAL at 20:25

## 2020-02-28 RX ADMIN — Medication 0.5 MILLIGRAM(S): at 08:43

## 2020-02-28 RX ADMIN — RISPERIDONE 0.25 MILLIGRAM(S): 4 TABLET ORAL at 20:25

## 2020-02-28 RX ADMIN — Medication 3 MILLIGRAM(S): at 20:25

## 2020-02-29 RX ADMIN — RISPERIDONE 0.25 MILLIGRAM(S): 4 TABLET ORAL at 20:05

## 2020-02-29 RX ADMIN — Medication 50 MILLIGRAM(S): at 08:28

## 2020-02-29 RX ADMIN — BUPROPION HYDROCHLORIDE 300 MILLIGRAM(S): 150 TABLET, EXTENDED RELEASE ORAL at 08:28

## 2020-02-29 RX ADMIN — Medication 3 MILLIGRAM(S): at 20:04

## 2020-02-29 RX ADMIN — ATORVASTATIN CALCIUM 20 MILLIGRAM(S): 80 TABLET, FILM COATED ORAL at 20:04

## 2020-02-29 RX ADMIN — Medication 1 TABLET(S): at 08:28

## 2020-03-01 RX ADMIN — Medication 650 MILLIGRAM(S): at 13:27

## 2020-03-01 RX ADMIN — Medication 650 MILLIGRAM(S): at 15:27

## 2020-03-01 RX ADMIN — BUPROPION HYDROCHLORIDE 300 MILLIGRAM(S): 150 TABLET, EXTENDED RELEASE ORAL at 08:56

## 2020-03-01 RX ADMIN — Medication 3 MILLIGRAM(S): at 20:48

## 2020-03-01 RX ADMIN — Medication 50 MILLIGRAM(S): at 08:56

## 2020-03-01 RX ADMIN — RISPERIDONE 0.25 MILLIGRAM(S): 4 TABLET ORAL at 20:49

## 2020-03-01 RX ADMIN — ATORVASTATIN CALCIUM 20 MILLIGRAM(S): 80 TABLET, FILM COATED ORAL at 20:48

## 2020-03-01 RX ADMIN — Medication 1 TABLET(S): at 08:56

## 2020-03-01 RX ADMIN — POLYETHYLENE GLYCOL 3350 17 GRAM(S): 17 POWDER, FOR SOLUTION ORAL at 13:30

## 2020-03-02 VITALS — TEMPERATURE: 98 F

## 2020-03-02 PROCEDURE — 99232 SBSQ HOSP IP/OBS MODERATE 35: CPT

## 2020-03-02 RX ADMIN — Medication 1 TABLET(S): at 09:00

## 2020-03-02 RX ADMIN — BUPROPION HYDROCHLORIDE 300 MILLIGRAM(S): 150 TABLET, EXTENDED RELEASE ORAL at 09:00

## 2020-03-16 ENCOUNTER — OUTPATIENT (OUTPATIENT)
Dept: OUTPATIENT SERVICES | Facility: HOSPITAL | Age: 80
LOS: 1 days | Discharge: HOME | End: 2020-03-16
Payer: COMMERCIAL

## 2020-04-09 DIAGNOSIS — F32.9 MAJOR DEPRESSIVE DISORDER, SINGLE EPISODE, UNSPECIFIED: ICD-10-CM

## 2021-03-31 PROCEDURE — 99443: CPT

## 2021-05-21 PROCEDURE — 98968 PH1 ASSMT&MGMT NQHP 21-30: CPT

## 2021-08-31 PROCEDURE — 99443: CPT

## 2021-11-16 PROCEDURE — 99443: CPT

## 2022-02-08 PROCEDURE — 99443: CPT

## 2022-04-25 NOTE — ED ADULT TRIAGE NOTE - AS O2 DELIVERY
[facility]  [address]  Phone: [phone]  Fax: [phone]           Greyson Chen MD      April 25, 2022     Patient: Juanita Cheng   MR Number: [id number]   YOB: 2003   Date of Visit: 4/25/2022       Dear Dr. Tanya Wong: Thank you for referring Juanita Cheng to me for evaluation/treatment. Below are the relevant portions of my assessment and plan of care. Diagnosis Orders   1. Recurrent tonsillitis           Does not meet criteria for tonsillectomy. Jared with next infection for culture and extended antibiotics. If you have questions, please do not hesitate to call me. I look forward to following Dane Burroughs along with you.     Sincerely,        Greyson Chen MD    CC providers:  Cristina Holstein, DO  [address]  [address] [place] [address] [address] [facility] [address]  [address] [facility] [address] No room air

## 2022-06-06 PROCEDURE — 99442: CPT

## 2022-09-01 PROCEDURE — 99443: CPT

## 2022-11-29 PROCEDURE — 99442: CPT

## 2023-05-16 NOTE — ASU PATIENT PROFILE, ADULT - MEDICATIONS TO TAKE
May take medications as prescribed with sips of water, will take aspirin as instructed by Dr. Molina's office

## 2023-05-16 NOTE — ASU PATIENT PROFILE, ADULT - VISION (WITH CORRECTIVE LENSES IF THE PATIENT USUALLY WEARS THEM):
patient glasses at home/Partially impaired: cannot see medication labels or newsprint, but can see obstacles in path, and the surrounding layout; can count fingers at arm's length

## 2023-05-16 NOTE — ASU PATIENT PROFILE, ADULT - ABILITY TO HEAR (WITH HEARING AID OR HEARING APPLIANCE IF NORMALLY USED):
[place]/Mildly to Moderately Impaired: difficulty hearing in some environments or speaker may need to increase volume or speak distinctly

## 2023-05-16 NOTE — ASU PATIENT PROFILE, ADULT - FALL HARM RISK - HARM RISK INTERVENTIONS

## 2023-05-17 ENCOUNTER — OUTPATIENT (OUTPATIENT)
Dept: OUTPATIENT SERVICES | Facility: HOSPITAL | Age: 83
LOS: 1 days | Discharge: HOME | End: 2023-05-17
Payer: MEDICARE

## 2023-05-17 VITALS
TEMPERATURE: 98 F | OXYGEN SATURATION: 97 % | WEIGHT: 100.09 LBS | DIASTOLIC BLOOD PRESSURE: 97 MMHG | HEIGHT: 62 IN | SYSTOLIC BLOOD PRESSURE: 158 MMHG | RESPIRATION RATE: 16 BRPM | HEART RATE: 67 BPM

## 2023-05-17 DIAGNOSIS — R06.02 SHORTNESS OF BREATH: ICD-10-CM

## 2023-05-17 PROCEDURE — 80048 BASIC METABOLIC PNL TOTAL CA: CPT

## 2023-05-17 PROCEDURE — 85025 COMPLETE CBC W/AUTO DIFF WBC: CPT

## 2023-05-17 PROCEDURE — C1725: CPT

## 2023-05-17 PROCEDURE — 36415 COLL VENOUS BLD VENIPUNCTURE: CPT

## 2023-05-17 PROCEDURE — 86900 BLOOD TYPING SEROLOGIC ABO: CPT

## 2023-05-17 PROCEDURE — C9600: CPT | Mod: LC

## 2023-05-17 PROCEDURE — 97116 GAIT TRAINING THERAPY: CPT | Mod: GP

## 2023-05-17 PROCEDURE — C1753: CPT

## 2023-05-17 PROCEDURE — 86901 BLOOD TYPING SEROLOGIC RH(D): CPT

## 2023-05-17 PROCEDURE — 93005 ELECTROCARDIOGRAM TRACING: CPT

## 2023-05-17 PROCEDURE — C1874: CPT

## 2023-05-17 PROCEDURE — C1894: CPT

## 2023-05-17 PROCEDURE — 97530 THERAPEUTIC ACTIVITIES: CPT | Mod: GP

## 2023-05-17 PROCEDURE — 93010 ELECTROCARDIOGRAM REPORT: CPT | Mod: 76

## 2023-05-17 PROCEDURE — 97162 PT EVAL MOD COMPLEX 30 MIN: CPT | Mod: GP

## 2023-05-17 PROCEDURE — 93458 L HRT ARTERY/VENTRICLE ANGIO: CPT | Mod: 59

## 2023-05-17 PROCEDURE — C1760: CPT

## 2023-05-17 PROCEDURE — C1887: CPT

## 2023-05-17 PROCEDURE — C1769: CPT

## 2023-05-17 PROCEDURE — 86850 RBC ANTIBODY SCREEN: CPT

## 2023-05-17 RX ORDER — LISINOPRIL 2.5 MG/1
20 TABLET ORAL DAILY
Refills: 0 | Status: DISCONTINUED | OUTPATIENT
Start: 2023-05-17 | End: 2023-05-18

## 2023-05-17 RX ORDER — BUPROPION HYDROCHLORIDE 150 MG/1
300 TABLET, EXTENDED RELEASE ORAL DAILY
Refills: 0 | Status: DISCONTINUED | OUTPATIENT
Start: 2023-05-17 | End: 2023-05-18

## 2023-05-17 RX ORDER — CARBIDOPA AND LEVODOPA 25; 100 MG/1; MG/1
1 TABLET ORAL DAILY
Refills: 0 | Status: DISCONTINUED | OUTPATIENT
Start: 2023-05-17 | End: 2023-05-18

## 2023-05-17 RX ORDER — ATORVASTATIN CALCIUM 80 MG/1
1 TABLET, FILM COATED ORAL
Qty: 90 | Refills: 2
Start: 2023-05-17

## 2023-05-17 RX ORDER — SODIUM CHLORIDE 9 MG/ML
250 INJECTION INTRAMUSCULAR; INTRAVENOUS; SUBCUTANEOUS ONCE
Refills: 0 | Status: COMPLETED | OUTPATIENT
Start: 2023-05-17 | End: 2023-05-17

## 2023-05-17 RX ORDER — ACETAMINOPHEN 500 MG
650 TABLET ORAL EVERY 6 HOURS
Refills: 0 | Status: DISCONTINUED | OUTPATIENT
Start: 2023-05-17 | End: 2023-05-18

## 2023-05-17 RX ORDER — CLOPIDOGREL BISULFATE 75 MG/1
1 TABLET, FILM COATED ORAL
Qty: 90 | Refills: 4
Start: 2023-05-17

## 2023-05-17 RX ORDER — ASPIRIN/CALCIUM CARB/MAGNESIUM 324 MG
81 TABLET ORAL DAILY
Refills: 0 | Status: DISCONTINUED | OUTPATIENT
Start: 2023-05-17 | End: 2023-05-18

## 2023-05-17 RX ORDER — SODIUM CHLORIDE 9 MG/ML
1000 INJECTION INTRAMUSCULAR; INTRAVENOUS; SUBCUTANEOUS
Refills: 0 | Status: DISCONTINUED | OUTPATIENT
Start: 2023-05-17 | End: 2023-05-18

## 2023-05-17 RX ORDER — ATORVASTATIN CALCIUM 80 MG/1
10 TABLET, FILM COATED ORAL AT BEDTIME
Refills: 0 | Status: DISCONTINUED | OUTPATIENT
Start: 2023-05-17 | End: 2023-05-18

## 2023-05-17 RX ORDER — CLOPIDOGREL BISULFATE 75 MG/1
75 TABLET, FILM COATED ORAL DAILY
Refills: 0 | Status: DISCONTINUED | OUTPATIENT
Start: 2023-05-18 | End: 2023-05-18

## 2023-05-17 RX ORDER — METOPROLOL TARTRATE 50 MG
50 TABLET ORAL DAILY
Refills: 0 | Status: DISCONTINUED | OUTPATIENT
Start: 2023-05-17 | End: 2023-05-18

## 2023-05-17 RX ADMIN — SODIUM CHLORIDE 90 MILLILITER(S): 9 INJECTION INTRAMUSCULAR; INTRAVENOUS; SUBCUTANEOUS at 09:48

## 2023-05-17 RX ADMIN — ATORVASTATIN CALCIUM 10 MILLIGRAM(S): 80 TABLET, FILM COATED ORAL at 21:37

## 2023-05-17 RX ADMIN — Medication 650 MILLIGRAM(S): at 19:45

## 2023-05-17 RX ADMIN — SODIUM CHLORIDE 250 MILLILITER(S): 9 INJECTION INTRAMUSCULAR; INTRAVENOUS; SUBCUTANEOUS at 07:48

## 2023-05-17 RX ADMIN — Medication 650 MILLIGRAM(S): at 20:00

## 2023-05-17 NOTE — CHART NOTE - NSCHARTNOTEFT_GEN_A_CORE
Patient is a 82y old  Female who presents with a chief complaint of LHC (17 May 2023 18:23)    status post cath via RFA. Site clean, dry, intact. Pulses present, capillary refill appropriate.  no signs of hematoma present, surrounding area soft. VSS no c/o pain.  Post cath instructions and medications reviewed, patient verbalizes and understands instructions. Patient resting comfortably in bed. OOB encouraged    Cath team to follow up in AM

## 2023-05-17 NOTE — DISCHARGE NOTE PROVIDER - NSDCCPCAREPLAN_GEN_ALL_CORE_FT
PRINCIPAL DISCHARGE DIAGNOSIS  Diagnosis: CAD, multiple vessel  Assessment and Plan of Treatment: Continue all medications including ASA and new medication Plavix  DO NOT STOP taking these medicines, they keep your stent open without first asking your Cardiologist.   Follow up with Dr. Guadarrama- need another procedure at [facility]    You may shower, no baths/swimming x one week. No heavy lifting greater than 5-10 pounds with right wrist x one week. No strenuous activity x 3 weeks. Monitor site of procedure and notify your doctor for any redness, swelling, discharge  Bandage: Keep bandage clean and dry. Remove after 24 hours.   Special Instructions:   the procedure was done in your leg ( groin), limit stair climbing for 48 hours. If you have bleeding from the procedure site: Lie down and remove the bandage. Apply hard pressure and call your doctor. If bleeding and swelling cannot be controlled, call 911   Call your doctor immediately if: 1) You have severe pain, swelling, or bruising at the procedure site. A small amount or soreness and bruising (black and blue) is normal. 2) Procedure site becomes very red or feels hot to touch. 3) Your hand becomes blue or feels cold to touch. 4)You feel sudden back or belly pain. 5)You develop fever      SECONDARY DISCHARGE DIAGNOSES  Diagnosis: HTN (hypertension)  Assessment and Plan of Treatment: Continue current blood pressure medication regimen as directed. Monitor for any visual changes, headaches or dizziness.  Monitor blood pressure regularly.  Follow up with your PCP for further management for high blood pressure, please call to make appointment within 1 week of discharge    Diagnosis: Anxiety  Assessment and Plan of Treatment: Continue medications. Follow up with your PCP for further evaluation and management. Please call to make an appointment within 1-2 weeks of discharge.

## 2023-05-17 NOTE — DISCHARGE NOTE PROVIDER - CARE PROVIDER_API CALL
Toshia Guadarrama)  Internal Medicine  [address] [place] [address]  Phone: [phone]  Fax: [phone]  Established Patient  Follow Up Time: 1 week

## 2023-05-17 NOTE — DISCHARGE NOTE PROVIDER - NSDCCPTREATMENT_GEN_ALL_CORE_FT
PRINCIPAL PROCEDURE  Procedure: Left heart cardiac cath  Findings and Treatment: stent to LAD   need for staged procedure to RCA in near future

## 2023-05-17 NOTE — PACU DISCHARGE NOTE - COMMENTS
Patient s/p LHC via RFA. Perclose to RFA. Dressing is clean dry and intact with no s/s of bleeding or hematoma. RLE warm and mobile. VS Stable. Patient denies pain. Report given to LENNY Clark on CICU. Patient placed on Zoll monitor and is pending transport to CICU at this time

## 2023-05-17 NOTE — DISCHARGE NOTE PROVIDER - HOSPITAL COURSE
82yr old female PMH HTN, HLD, anxiety, depression, lung nodule with c/o SOB with exertion. Pt. had a CTA which revealed significant stenosis noted in the mid RCA, moderate stenosis noted in the proximal third diagonal arteries. Pt. now  s/p MATT to LCX       CAD  -encourage po hydration  -medical Mx for CAD  -continue home medications including ASA  - start plavix 75mg daily, lipitor 10mg at HS  -Discussed therapeutic lifestyle changes to reduce risk factors such as following a cardiac diet, weight loss, maintaining a healthy weight, exercise, smoking cessation, medication compliance, and regular follow-up  with PCP/Cardioloigst  -Qualified for cardiac rehab- deferred for need to staged PCI to RCA   - stable for d/c home  with home PT and walker

## 2023-05-17 NOTE — H&P ADULT - NSICDXPASTMEDICALHX_GEN_ALL_CORE_FT
PAST MEDICAL HISTORY:  Anxiety     Anxiety with depression     HLD (hyperlipidemia)     HTN (hypertension)

## 2023-05-17 NOTE — PROGRESS NOTE ADULT - SUBJECTIVE AND OBJECTIVE BOX
Cath Lab Nurse Practitioner Note  HPI:  82yr old female PMH HTN, HLD, anxiety, depression, lung nodule with c/o SOB with exertion. Pt. had a CTA which revealed significant stenosis noted in the mid RCA, moderate stenosis noted in the proximal third diagonal arteries. Pt. now referred for C for further evaluation.   (17 May 2023 07:23)    s/p LHC : MATT to LCX  Pt denies chest pain/SOB/palpitations post cath.    Vital Signs  Vital Signs Last 24 Hrs  T(C): 36.4 (17 May 2023 07:12), Max: 36.4 (17 May 2023 07:12)  T(F): 97.6 (17 May 2023 07:12), Max: 97.6 (17 May 2023 07:12)  HR: 67 (17 May 2023 07:12) (67 - 67)  BP: 158/97 (17 May 2023 07:12) (158/97 - 158/97)  BP(mean): --  RR: 16 (17 May 2023 07:12) (16 - 16)  SpO2: 97% (17 May 2023 07:12) (97% - 97%)    Parameters below as of 17 May 2023 07:12  Patient On (Oxygen Delivery Method): room air          Labs:                  MEDICATIONS  (STANDING):  aspirin  chewable 81 milliGRAM(s) Oral daily  atorvastatin 10 milliGRAM(s) Oral at bedtime  buPROPion XL (24-Hour) 300 milliGRAM(s) Oral daily  carbidopa/levodopa  25/100 1 Tablet(s) Oral daily  lisinopril 20 milliGRAM(s) Oral daily  metoprolol succinate ER 50 milliGRAM(s) Oral daily  multivitamin 1 Tablet(s) Oral daily  sodium chloride 0.9%. 1000 milliLiter(s) (90 mL/Hr) IV Continuous <Continuous>      PHYSICAL EXAM  GENERAL: NAD, AAOx3  CHEST/LUNG: Clear to auscultation bilaterally; No wheeze  HEART: s1 s2 Regular rate and rhythm; No murmurs, rubs, or gallops  ABDOMEN: Soft, Nontender, Nondistended; Bowel sounds present X 4 quadrants   EXTREMITIES:  2+ Peripheral Pulses, No clubbing, cyanosis, or edema  Psych: normal affect and behavior, calm and cooperative   PROCEDURE SITE: RFA accessed, site closed via perclose ,Site is without hematoma or bleeding. Sensation and MELLY intact. Distal pulses palpable 2+, capillary refill < 2 seconds. Patient denies pain, numbness, tingling, CP or SOB.      EKG post PCI- NSR rate 74    PROCEDURE RESULTS- < from: Cardiac Catheterization (05.17.23 @ 08:21) >   Impression     Diagnostic Conclusions   3 Vessel CAD with moderate LAD disease and significant RCA and LCX   disease. NL LV FX     Interventional Conclusions     Successful PCI of LCX     Recommendations     PCI of RCA with rotational atherectomy in future.    Estimated Blood Loss:5ml.    Complications:  No complication.     Signatures     ----------------------------------------------------------------   Electronically signed by Severiano Molina MD(Performing   Physician) on 05/17/2023 09:32    < end of copied text >

## 2023-05-17 NOTE — H&P ADULT - ASSESSMENT
82yr old female PMH HTN, HLD, anxiety, depression, lung nodule with c/o SOB with exertion. Pt. had a CTA which revealed significant stenosis noted in the mid RCA, moderate stenosis noted in the proximal third diagonal arteries. Pt. now referred for [facility] for further evaluation.    Consent obtained for and signed for cardiac cath with coronary angiogram and possible stent placement with possible sedation and analgesia. [facility] risks, benefits and alternatives discussed with patient. Risk discussed included, but not limited to MI, stroke, mortality, major bleeding, arrythmia, or infection, educational material provided. Pt. verbalizes and understands pre-procedural instructions.   ASA: II  Bleeding Risk Score: 3.2  Creatinine: 0.9  GFR:  60  Tim Score NASIMA 5 points    Pt. pre-hydrated with sodium chloride 250cc bolus IV x1

## 2023-05-17 NOTE — H&P ADULT - HISTORY OF PRESENT ILLNESS
82yr old female PMH HTN, HLD, anxiety, depression, lung nodule with c/o SOB with exertion. Pt. had a CTA which revealed significant stenosis noted in the mid RCA, moderate stenosis noted in the proximal third diagonal arteries. Pt. now referred for [facility] for further evaluation.

## 2023-05-17 NOTE — H&P ADULT - NSHPLABSRESULTS_GEN_ALL_CORE
CTA revealed significant stenosis noted in the mid RCA, moderate stenosis noted in the proximal third diagonal arteries.

## 2023-05-17 NOTE — DISCHARGE NOTE PROVIDER - NSDCMRMEDTOKEN_GEN_ALL_CORE_FT
aspirin 81 mg oral tablet: 81 milligram(s) orally once a day  atorvastatin 10 mg oral tablet: 1 tab(s) orally once a day (at bedtime)  buPROPion 300 mg/24 hours (XL) oral tablet, extended release: 1 tab(s) orally once a day  carbidopa-levodopa 25 mg-100 mg oral tablet: 1 tab(s) orally once a day  clopidogrel 75 mg oral tablet: 1 tab(s) orally once a day  lisinopril 20 mg oral tablet: 1 tab(s) orally once a day  metoprolol succinate 50 mg oral tablet, extended release: 1 tab(s) orally once a day  Multiple Vitamins oral tablet: 1 tab(s) orally once a day

## 2023-05-17 NOTE — H&P ADULT - NSHPREVIEWOFSYSTEMS_GEN_ALL_CORE
CONSTITUTIONAL: No fever, weight loss, or fatigue  EYES: No eye pain, visual disturbances, or discharge  ENMT:  No difficulty hearing, tinnitus, vertigo; No sinus or throat pain  NECK: No pain or stiffness  BREASTS: No pain, masses, or nipple discharge  RESPIRATORY: + shortness of breath on exertion   CARDIOVASCULAR: No chest pain, palpitations, dizziness, or leg swelling  GASTROINTESTINAL: No abdominal or epigastric pain. No nausea, vomiting, or hematemesis; No diarrhea or constipation. No melena or hematochezia.  GENITOURINARY: No dysuria, frequency, hematuria, or incontinence  NEUROLOGICAL: No headaches, memory loss, loss of strength, numbness, or tremors  SKIN: No itching, burning, rashes, or lesions   ENDOCRINE: No heat or cold intolerance; No hair loss  MUSCULOSKELETAL: No joint pain or swelling; No muscle, back, or extremity pain  PSYCHIATRIC: No depression, anxiety, mood swings, or difficulty sleeping

## 2023-05-17 NOTE — PROGRESS NOTE ADULT - ASSESSMENT
82yr old female PMH HTN, HLD, anxiety, depression, lung nodule with c/o SOB with exertion. Pt. had a CTA which revealed significant stenosis noted in the mid RCA, moderate stenosis noted in the proximal third diagonal arteries. Pt. now referred for [facility] for further evaluation.   (17 May 2023 07:23)    #CAD  - s/p MATT to LCX  -Admit to tele/cicu   -IV hydration per NASIMA protocol   -VS, lab, diet and activity per PCI post orders  -continue home medications including ASA  - start plavix 75mg daily, lipitor 10mg at HS  -f/u EKG in AM, AM Labs  -Discussed therapeutic lifestyle changes to reduce risk factors such as following a cardiac diet, weight loss, maintaining a healthy weight, exercise, smoking cessation, medication compliance, and regular follow-up  with PCP/Cardioloigst  -Qualified for cardiac rehab- deferred for need to staged PCI to RCA   --Post cath instructions reviewed, patient verbalizes and understands instructions  -plan for staged PCI to RCA in 2 weeks- f/u with Dr. Guadarrama in 1 week   - -plan discussed with patient, RN and Dr Molina

## 2023-05-18 VITALS — OXYGEN SATURATION: 95 % | SYSTOLIC BLOOD PRESSURE: 144 MMHG | DIASTOLIC BLOOD PRESSURE: 80 MMHG

## 2023-05-18 LAB
ANION GAP SERPL CALC-SCNC: 6 MMOL/L — SIGNIFICANT CHANGE UP (ref 5–17)
BASOPHILS # BLD AUTO: 0.04 K/UL — SIGNIFICANT CHANGE UP (ref 0–0.2)
BASOPHILS NFR BLD AUTO: 0.4 % — SIGNIFICANT CHANGE UP (ref 0–2)
BUN SERPL-MCNC: 29 MG/DL — HIGH (ref 7–23)
CALCIUM SERPL-MCNC: 8.9 MG/DL — SIGNIFICANT CHANGE UP (ref 8.5–10.1)
CHLORIDE SERPL-SCNC: 110 MMOL/L — HIGH (ref 96–108)
CO2 SERPL-SCNC: 28 MMOL/L — SIGNIFICANT CHANGE UP (ref 22–31)
CREAT SERPL-MCNC: 0.68 MG/DL — SIGNIFICANT CHANGE UP (ref 0.5–1.3)
EGFR: 88 ML/MIN/1.73M2 — SIGNIFICANT CHANGE UP
EOSINOPHIL # BLD AUTO: 0.09 K/UL — SIGNIFICANT CHANGE UP (ref 0–0.5)
EOSINOPHIL NFR BLD AUTO: 1 % — SIGNIFICANT CHANGE UP (ref 0–6)
GLUCOSE SERPL-MCNC: 105 MG/DL — HIGH (ref 70–99)
HCT VFR BLD CALC: 38.2 % — SIGNIFICANT CHANGE UP (ref 34.5–45)
HGB BLD-MCNC: 12.6 G/DL — SIGNIFICANT CHANGE UP (ref 11.5–15.5)
IMM GRANULOCYTES NFR BLD AUTO: 0.3 % — SIGNIFICANT CHANGE UP (ref 0–0.9)
LYMPHOCYTES # BLD AUTO: 1.67 K/UL — SIGNIFICANT CHANGE UP (ref 1–3.3)
LYMPHOCYTES # BLD AUTO: 17.7 % — SIGNIFICANT CHANGE UP (ref 13–44)
MCHC RBC-ENTMCNC: 31.3 PG — SIGNIFICANT CHANGE UP (ref 27–34)
MCHC RBC-ENTMCNC: 33 GM/DL — SIGNIFICANT CHANGE UP (ref 32–36)
MCV RBC AUTO: 95 FL — SIGNIFICANT CHANGE UP (ref 80–100)
MONOCYTES # BLD AUTO: 0.83 K/UL — SIGNIFICANT CHANGE UP (ref 0–0.9)
MONOCYTES NFR BLD AUTO: 8.8 % — SIGNIFICANT CHANGE UP (ref 2–14)
NEUTROPHILS # BLD AUTO: 6.8 K/UL — SIGNIFICANT CHANGE UP (ref 1.8–7.4)
NEUTROPHILS NFR BLD AUTO: 71.8 % — SIGNIFICANT CHANGE UP (ref 43–77)
PLATELET # BLD AUTO: 255 K/UL — SIGNIFICANT CHANGE UP (ref 150–400)
POTASSIUM SERPL-MCNC: 3.8 MMOL/L — SIGNIFICANT CHANGE UP (ref 3.5–5.3)
POTASSIUM SERPL-SCNC: 3.8 MMOL/L — SIGNIFICANT CHANGE UP (ref 3.5–5.3)
RBC # BLD: 4.02 M/UL — SIGNIFICANT CHANGE UP (ref 3.8–5.2)
RBC # FLD: 13.2 % — SIGNIFICANT CHANGE UP (ref 10.3–14.5)
SODIUM SERPL-SCNC: 144 MMOL/L — SIGNIFICANT CHANGE UP (ref 135–145)
WBC # BLD: 9.46 K/UL — SIGNIFICANT CHANGE UP (ref 3.8–10.5)
WBC # FLD AUTO: 9.46 K/UL — SIGNIFICANT CHANGE UP (ref 3.8–10.5)

## 2023-05-18 PROCEDURE — 93010 ELECTROCARDIOGRAM REPORT: CPT

## 2023-05-18 RX ADMIN — Medication 50 MILLIGRAM(S): at 10:10

## 2023-05-18 RX ADMIN — CLOPIDOGREL BISULFATE 75 MILLIGRAM(S): 75 TABLET, FILM COATED ORAL at 10:10

## 2023-05-18 RX ADMIN — LISINOPRIL 20 MILLIGRAM(S): 2.5 TABLET ORAL at 10:11

## 2023-05-18 RX ADMIN — Medication 81 MILLIGRAM(S): at 10:09

## 2023-05-18 RX ADMIN — BUPROPION HYDROCHLORIDE 300 MILLIGRAM(S): 150 TABLET, EXTENDED RELEASE ORAL at 10:10

## 2023-05-18 RX ADMIN — CARBIDOPA AND LEVODOPA 1 TABLET(S): 25; 100 TABLET ORAL at 10:10

## 2023-05-18 RX ADMIN — Medication 1 TABLET(S): at 10:09

## 2023-05-18 NOTE — PHYSICAL THERAPY INITIAL EVALUATION ADULT - ADDITIONAL COMMENTS
Has a Rolling Walker but does not use Lives in home with all rooms on the first floor. Brother and sister in law live in the same complex. Aide assists her 26 hours per week. +Driving very infrequently.

## 2023-05-18 NOTE — PROGRESS NOTE ADULT - SUBJECTIVE AND OBJECTIVE BOX
Detail Level: Detailed Cath Lab Nurse Practitioner Note  HPI:  82yr old female PMH HTN, HLD, anxiety, depression, lung nodule with c/o SOB with exertion. Pt. had a CTA which revealed significant stenosis noted in the mid RCA, moderate stenosis noted in the proximal third diagonal arteries. Pt. now referred for [facility] for further evaluation.   (17 May 2023 07:23)    s/p LHC : MATT to LCx    subjective/Observations: seen and examined and evaluated. resting comfortably in bed in NAD, with no respiratory distress, no chest pain, dyspnea, palpitations, PND, or orthopnea.    INTERVAL/OVERNIGHT EVENTS: no events overnight     Vital Signs Last 24 Hrs  T(C): 36.7 (18 May 2023 06:43), Max: 37.8 (17 May 2023 19:08)  T(F): 98.1 (18 May 2023 06:43), Max: 100 (17 May 2023 19:08)  HR: 81 (18 May 2023 05:00) (71 - 98)  BP: 112/52 (18 May 2023 05:00) (111/48 - 168/68)  BP(mean): 70 (18 May 2023 05:00) (67 - 105)  RR: 21 (18 May 2023 05:00) (16 - 35)  SpO2: 94% (18 May 2023 05:00) (93% - 98%)    Parameters below as of 17 May 2023 18:00  Patient On (Oxygen Delivery Method): room air        PHYSICAL EXAM:  NEURO: Non-focal, AxOx3.  No neuro deficits NECK: Supple, No JVD, No LAD  CHEST/LUNG: Clear to auscultation bilaterally; No wheeze  HEART: s1 s2 Regular rate and rhythm; No murmurs, rubs, or gallops  ABDOMEN: Soft, Nontender, Nondistended; Bowel sounds present X 4 quadrants   EXTREMITIES:  2+ Peripheral Pulses, No clubbing, cyanosis, or edema   VASCULAR: Peripheral pulses palpable 2+ bilaterally  PROCEDURE SITE: RFA; dressing removed, site cleaned. Site is without hematoma or bleeding. Sensation and MELLY intact. Distal pulses palpable 2+, capillary refill < 2 seconds. Patient denies pain, numbness, tingling, CP or SOB.     Labs:                        12.6   9.46  )-----------( 255      ( 18 May 2023 05:53 )             38.2     05-18    144  |  110<H>  |  29<H>  ----------------------------<  105<H>  3.8   |  28  |  0.68    Ca    8.9      18 May 2023 05:53        MEDICATIONS  (STANDING):  aspirin  chewable 81 milliGRAM(s) Oral daily  atorvastatin 10 milliGRAM(s) Oral at bedtime  buPROPion XL (24-Hour) 300 milliGRAM(s) Oral daily  carbidopa/levodopa  25/100 1 Tablet(s) Oral daily  clopidogrel Tablet 75 milliGRAM(s) Oral daily  lisinopril 20 milliGRAM(s) Oral daily  metoprolol succinate ER 50 milliGRAM(s) Oral daily  multivitamin 1 Tablet(s) Oral daily  sodium chloride 0.9%. 1000 milliLiter(s) (90 mL/Hr) IV Continuous <Continuous>      RADIOLOGY   < from: Cardiac Catheterization (05.17.23 @ 08:21) >  Impression     Diagnostic Conclusions   3 Vessel CAD with moderate LAD disease and significant RCA and LCX   disease. NL LV FX     Interventional Conclusions     Successful PCI of LCX     Recommendations     PCI of RCA with rotational atherectomy in future.    Estimated Blood Loss:5ml.    Complications:  No complication.     Signatures     ----------------------------------------------------------------   Electronically signed by Severiano Molina MD(Performing   Physician) on 05/17/2023 09:32   ----------------------------------------------------------------    < end of copied text >      EKG AM post PCI- NSR rate 85       Detail Level: Zone

## 2023-05-18 NOTE — DISCHARGE NOTE NURSING/CASE MANAGEMENT/SOCIAL WORK - PATIENT PORTAL LINK FT
You can access the FollowMyHealth Patient Portal offered by [facility] by registering at the following website: [email or web address]. By joining [facility]’s FollowMyHealth portal, you will also be able to view your health information using other applications (apps) compatible with our system.

## 2023-05-18 NOTE — PHYSICAL THERAPY INITIAL EVALUATION ADULT - TRANSFER SKILLS, REHAB EVAL
Infant remains in an isolette on servo control, temperatures stable. Remains on 3 L vapotherm. FiO2 21%-23%. No apnea/bradycardic episodes. CBGs drawn at 2000 and 0500, no changes. X-Ray obtained. L hand PIV remains intact with fluids infusing without difficulty, glucose stable. Remains NPO. OG tube vented. CBC, CMP, and direct bilirubin labs drawn. UOP 4.88 mL/kg/hr. 1 stool. No contact from family this shift.   independent

## 2023-05-18 NOTE — PHYSICAL THERAPY INITIAL EVALUATION ADULT - PERTINENT HX OF CURRENT PROBLEM, REHAB EVAL
82yr old female PMH HTN, HLD, anxiety, depression, lung nodule with c/o SOB with exertion. Pt. had a CTA which revealed significant stenosis noted in the mid RCA, moderate stenosis noted in the proximal third diagonal arteries. Pt. now  s/p MATT to LCX

## 2023-05-18 NOTE — DISCHARGE NOTE NURSING/CASE MANAGEMENT/SOCIAL WORK - HAVE YOU HAD COVID IN THE LAST 60 DAYS?
[FreeTextEntry1] : imitrex for migraine\par flu and moderna vaccine gien\par check labs\par ekg ok\par patient in good health No

## 2023-05-18 NOTE — PROGRESS NOTE ADULT - ASSESSMENT
82yr old female PMH HTN, HLD, anxiety, depression, lung nodule with c/o SOB with exertion. Pt. had a CTA which revealed significant stenosis noted in the mid RCA, moderate stenosis noted in the proximal third diagonal arteries. Pt. now  s/p MATT to LCX       CAD  -encourage po hydration  -medical Mx for CAD  -continue home medications including ASA  - start plavix 75mg daily, lipitor 10mg at HS  -Discussed therapeutic lifestyle changes to reduce risk factors such as following a cardiac diet, weight loss, maintaining a healthy weight, exercise, smoking cessation, medication compliance, and regular follow-up  with PCP/Cardioloigst  -Qualified for cardiac rehab- deferred for need to staged PCI to RCA    82yr old female PMH HTN, HLD, anxiety, depression, lung nodule with c/o SOB with exertion. Pt. had a CTA which revealed significant stenosis noted in the mid RCA, moderate stenosis noted in the proximal third diagonal arteries. Pt. now  s/p MATT to LCX       CAD  -encourage po hydration  -medical Mx for CAD  -continue home medications including ASA  - start plavix 75mg daily, lipitor 10mg at HS  -Discussed therapeutic lifestyle changes to reduce risk factors such as following a cardiac diet, weight loss, maintaining a healthy weight, exercise, smoking cessation, medication compliance, and regular follow-up  with PCP/Cardioloigst  -Qualified for cardiac rehab- deferred for need to staged PCI to RCA   - PT evaluated patient recommend home with home pt and walker   patient stable for d/c home today

## 2023-05-18 NOTE — PHYSICAL THERAPY INITIAL EVALUATION ADULT - GENERAL OBSERVATIONS, REHAB EVAL
Pt found supine in bed in NAD, +IV, +Purewick, +Tele agreeable to participate in PT Evaluation. Patient requires Leonor for bed mobility and transfers. Patient is able to ambulate 100 feet with RW and CGA. Patient returned to bed with call bell and phone in reach, bed alarm on, LENNY Dior notified.

## 2023-05-18 NOTE — DISCHARGE NOTE NURSING/CASE MANAGEMENT/SOCIAL WORK - NSDCPEFALRISK_GEN_ALL_CORE
For information on Fall & Injury Prevention, visit: [email or web address] OR  [email or web address] OR  https://www.cdc.gov/steadi/patient.html

## 2023-05-19 DIAGNOSIS — I10 ESSENTIAL (PRIMARY) HYPERTENSION: ICD-10-CM

## 2023-05-19 DIAGNOSIS — I25.118 ATHEROSCLEROTIC HEART DISEASE OF NATIVE CORONARY ARTERY WITH OTHER FORMS OF ANGINA PECTORIS: ICD-10-CM

## 2023-05-19 DIAGNOSIS — E78.5 HYPERLIPIDEMIA, UNSPECIFIED: ICD-10-CM

## 2023-10-19 ENCOUNTER — EMERGENCY (EMERGENCY)
Facility: HOSPITAL | Age: 83
LOS: 0 days | Discharge: HOME | End: 2023-10-20
Attending: EMERGENCY MEDICINE
Payer: MEDICARE

## 2023-10-19 VITALS
HEART RATE: 77 BPM | OXYGEN SATURATION: 99 % | TEMPERATURE: 98 F | RESPIRATION RATE: 20 BRPM | SYSTOLIC BLOOD PRESSURE: 159 MMHG | DIASTOLIC BLOOD PRESSURE: 75 MMHG

## 2023-10-19 DIAGNOSIS — E78.5 HYPERLIPIDEMIA, UNSPECIFIED: ICD-10-CM

## 2023-10-19 DIAGNOSIS — R41.0 DISORIENTATION, UNSPECIFIED: ICD-10-CM

## 2023-10-19 DIAGNOSIS — G20.A1 PARKINSON'S DISEASE WITHOUT DYSKINESIA, WITHOUT MENTION OF FLUCTUATIONS: ICD-10-CM

## 2023-10-19 DIAGNOSIS — F32.A DEPRESSION, UNSPECIFIED: ICD-10-CM

## 2023-10-19 DIAGNOSIS — Z79.82 LONG TERM (CURRENT) USE OF ASPIRIN: ICD-10-CM

## 2023-10-19 DIAGNOSIS — Z79.02 LONG TERM (CURRENT) USE OF ANTITHROMBOTICS/ANTIPLATELETS: ICD-10-CM

## 2023-10-19 DIAGNOSIS — R41.82 ALTERED MENTAL STATUS, UNSPECIFIED: ICD-10-CM

## 2023-10-19 DIAGNOSIS — I10 ESSENTIAL (PRIMARY) HYPERTENSION: ICD-10-CM

## 2023-10-19 DIAGNOSIS — F41.9 ANXIETY DISORDER, UNSPECIFIED: ICD-10-CM

## 2023-10-19 LAB
AMPHET UR-MCNC: NEGATIVE — SIGNIFICANT CHANGE UP
AMPHET UR-MCNC: NEGATIVE — SIGNIFICANT CHANGE UP
ANION GAP SERPL CALC-SCNC: 3 MMOL/L — LOW (ref 5–17)
ANION GAP SERPL CALC-SCNC: 3 MMOL/L — LOW (ref 5–17)
APAP SERPL-MCNC: <2 UG/ML — LOW (ref 10–30)
APAP SERPL-MCNC: <2 UG/ML — LOW (ref 10–30)
APPEARANCE UR: ABNORMAL
APPEARANCE UR: ABNORMAL
BACTERIA # UR AUTO: ABNORMAL /HPF
BACTERIA # UR AUTO: ABNORMAL /HPF
BARBITURATES UR SCN-MCNC: NEGATIVE — SIGNIFICANT CHANGE UP
BARBITURATES UR SCN-MCNC: NEGATIVE — SIGNIFICANT CHANGE UP
BASOPHILS # BLD AUTO: 0.06 K/UL — SIGNIFICANT CHANGE UP (ref 0–0.2)
BASOPHILS # BLD AUTO: 0.06 K/UL — SIGNIFICANT CHANGE UP (ref 0–0.2)
BASOPHILS NFR BLD AUTO: 0.9 % — SIGNIFICANT CHANGE UP (ref 0–2)
BASOPHILS NFR BLD AUTO: 0.9 % — SIGNIFICANT CHANGE UP (ref 0–2)
BENZODIAZ UR-MCNC: NEGATIVE — SIGNIFICANT CHANGE UP
BENZODIAZ UR-MCNC: NEGATIVE — SIGNIFICANT CHANGE UP
BILIRUB UR-MCNC: NEGATIVE — SIGNIFICANT CHANGE UP
BILIRUB UR-MCNC: NEGATIVE — SIGNIFICANT CHANGE UP
BUN SERPL-MCNC: 34 MG/DL — HIGH (ref 7–23)
BUN SERPL-MCNC: 34 MG/DL — HIGH (ref 7–23)
CALCIUM SERPL-MCNC: 9.5 MG/DL — SIGNIFICANT CHANGE UP (ref 8.5–10.1)
CALCIUM SERPL-MCNC: 9.5 MG/DL — SIGNIFICANT CHANGE UP (ref 8.5–10.1)
CAST: 1 /LPF — SIGNIFICANT CHANGE UP (ref 0–4)
CAST: 1 /LPF — SIGNIFICANT CHANGE UP (ref 0–4)
CHLORIDE SERPL-SCNC: 108 MMOL/L — SIGNIFICANT CHANGE UP (ref 96–108)
CHLORIDE SERPL-SCNC: 108 MMOL/L — SIGNIFICANT CHANGE UP (ref 96–108)
CO2 SERPL-SCNC: 30 MMOL/L — SIGNIFICANT CHANGE UP (ref 22–31)
CO2 SERPL-SCNC: 30 MMOL/L — SIGNIFICANT CHANGE UP (ref 22–31)
COCAINE METAB.OTHER UR-MCNC: NEGATIVE — SIGNIFICANT CHANGE UP
COCAINE METAB.OTHER UR-MCNC: NEGATIVE — SIGNIFICANT CHANGE UP
COLOR SPEC: YELLOW — SIGNIFICANT CHANGE UP
COLOR SPEC: YELLOW — SIGNIFICANT CHANGE UP
CREAT SERPL-MCNC: 0.76 MG/DL — SIGNIFICANT CHANGE UP (ref 0.5–1.3)
CREAT SERPL-MCNC: 0.76 MG/DL — SIGNIFICANT CHANGE UP (ref 0.5–1.3)
DIFF PNL FLD: ABNORMAL
DIFF PNL FLD: ABNORMAL
EGFR: 79 ML/MIN/1.73M2 — SIGNIFICANT CHANGE UP
EGFR: 79 ML/MIN/1.73M2 — SIGNIFICANT CHANGE UP
EOSINOPHIL # BLD AUTO: 0.1 K/UL — SIGNIFICANT CHANGE UP (ref 0–0.5)
EOSINOPHIL # BLD AUTO: 0.1 K/UL — SIGNIFICANT CHANGE UP (ref 0–0.5)
EOSINOPHIL NFR BLD AUTO: 1.5 % — SIGNIFICANT CHANGE UP (ref 0–6)
EOSINOPHIL NFR BLD AUTO: 1.5 % — SIGNIFICANT CHANGE UP (ref 0–6)
ETHANOL SERPL-MCNC: <10 MG/DL — SIGNIFICANT CHANGE UP (ref 0–10)
ETHANOL SERPL-MCNC: <10 MG/DL — SIGNIFICANT CHANGE UP (ref 0–10)
GLUCOSE SERPL-MCNC: 92 MG/DL — SIGNIFICANT CHANGE UP (ref 70–99)
GLUCOSE SERPL-MCNC: 92 MG/DL — SIGNIFICANT CHANGE UP (ref 70–99)
GLUCOSE UR QL: NEGATIVE MG/DL — SIGNIFICANT CHANGE UP
GLUCOSE UR QL: NEGATIVE MG/DL — SIGNIFICANT CHANGE UP
GRAN CASTS # UR COMP ASSIST: PRESENT
GRAN CASTS # UR COMP ASSIST: PRESENT
HCT VFR BLD CALC: 36.3 % — SIGNIFICANT CHANGE UP (ref 34.5–45)
HCT VFR BLD CALC: 36.3 % — SIGNIFICANT CHANGE UP (ref 34.5–45)
HGB BLD-MCNC: 11.8 G/DL — SIGNIFICANT CHANGE UP (ref 11.5–15.5)
HGB BLD-MCNC: 11.8 G/DL — SIGNIFICANT CHANGE UP (ref 11.5–15.5)
IMM GRANULOCYTES NFR BLD AUTO: 0.3 % — SIGNIFICANT CHANGE UP (ref 0–0.9)
IMM GRANULOCYTES NFR BLD AUTO: 0.3 % — SIGNIFICANT CHANGE UP (ref 0–0.9)
KETONES UR-MCNC: NEGATIVE MG/DL — SIGNIFICANT CHANGE UP
KETONES UR-MCNC: NEGATIVE MG/DL — SIGNIFICANT CHANGE UP
LEUKOCYTE ESTERASE UR-ACNC: ABNORMAL
LEUKOCYTE ESTERASE UR-ACNC: ABNORMAL
LYMPHOCYTES # BLD AUTO: 1.35 K/UL — SIGNIFICANT CHANGE UP (ref 1–3.3)
LYMPHOCYTES # BLD AUTO: 1.35 K/UL — SIGNIFICANT CHANGE UP (ref 1–3.3)
LYMPHOCYTES # BLD AUTO: 20.3 % — SIGNIFICANT CHANGE UP (ref 13–44)
LYMPHOCYTES # BLD AUTO: 20.3 % — SIGNIFICANT CHANGE UP (ref 13–44)
MCHC RBC-ENTMCNC: 31 PG — SIGNIFICANT CHANGE UP (ref 27–34)
MCHC RBC-ENTMCNC: 31 PG — SIGNIFICANT CHANGE UP (ref 27–34)
MCHC RBC-ENTMCNC: 32.5 GM/DL — SIGNIFICANT CHANGE UP (ref 32–36)
MCHC RBC-ENTMCNC: 32.5 GM/DL — SIGNIFICANT CHANGE UP (ref 32–36)
MCV RBC AUTO: 95.3 FL — SIGNIFICANT CHANGE UP (ref 80–100)
MCV RBC AUTO: 95.3 FL — SIGNIFICANT CHANGE UP (ref 80–100)
METHADONE UR-MCNC: NEGATIVE — SIGNIFICANT CHANGE UP
METHADONE UR-MCNC: NEGATIVE — SIGNIFICANT CHANGE UP
MONOCYTES # BLD AUTO: 0.63 K/UL — SIGNIFICANT CHANGE UP (ref 0–0.9)
MONOCYTES # BLD AUTO: 0.63 K/UL — SIGNIFICANT CHANGE UP (ref 0–0.9)
MONOCYTES NFR BLD AUTO: 9.5 % — SIGNIFICANT CHANGE UP (ref 2–14)
MONOCYTES NFR BLD AUTO: 9.5 % — SIGNIFICANT CHANGE UP (ref 2–14)
NEUTROPHILS # BLD AUTO: 4.48 K/UL — SIGNIFICANT CHANGE UP (ref 1.8–7.4)
NEUTROPHILS # BLD AUTO: 4.48 K/UL — SIGNIFICANT CHANGE UP (ref 1.8–7.4)
NEUTROPHILS NFR BLD AUTO: 67.5 % — SIGNIFICANT CHANGE UP (ref 43–77)
NEUTROPHILS NFR BLD AUTO: 67.5 % — SIGNIFICANT CHANGE UP (ref 43–77)
NITRITE UR-MCNC: NEGATIVE — SIGNIFICANT CHANGE UP
NITRITE UR-MCNC: NEGATIVE — SIGNIFICANT CHANGE UP
OPIATES UR-MCNC: NEGATIVE — SIGNIFICANT CHANGE UP
OPIATES UR-MCNC: NEGATIVE — SIGNIFICANT CHANGE UP
PCP SPEC-MCNC: SIGNIFICANT CHANGE UP
PCP SPEC-MCNC: SIGNIFICANT CHANGE UP
PCP UR-MCNC: NEGATIVE — SIGNIFICANT CHANGE UP
PCP UR-MCNC: NEGATIVE — SIGNIFICANT CHANGE UP
PH UR: 6 — SIGNIFICANT CHANGE UP (ref 5–8)
PH UR: 6 — SIGNIFICANT CHANGE UP (ref 5–8)
PLATELET # BLD AUTO: 336 K/UL — SIGNIFICANT CHANGE UP (ref 150–400)
PLATELET # BLD AUTO: 336 K/UL — SIGNIFICANT CHANGE UP (ref 150–400)
POTASSIUM SERPL-MCNC: 4.7 MMOL/L — SIGNIFICANT CHANGE UP (ref 3.5–5.3)
POTASSIUM SERPL-MCNC: 4.7 MMOL/L — SIGNIFICANT CHANGE UP (ref 3.5–5.3)
POTASSIUM SERPL-SCNC: 4.7 MMOL/L — SIGNIFICANT CHANGE UP (ref 3.5–5.3)
POTASSIUM SERPL-SCNC: 4.7 MMOL/L — SIGNIFICANT CHANGE UP (ref 3.5–5.3)
PROT UR-MCNC: NEGATIVE MG/DL — SIGNIFICANT CHANGE UP
PROT UR-MCNC: NEGATIVE MG/DL — SIGNIFICANT CHANGE UP
RBC # BLD: 3.81 M/UL — SIGNIFICANT CHANGE UP (ref 3.8–5.2)
RBC # BLD: 3.81 M/UL — SIGNIFICANT CHANGE UP (ref 3.8–5.2)
RBC # FLD: 13.9 % — SIGNIFICANT CHANGE UP (ref 10.3–14.5)
RBC # FLD: 13.9 % — SIGNIFICANT CHANGE UP (ref 10.3–14.5)
RBC CASTS # UR COMP ASSIST: 19 /HPF — HIGH (ref 0–4)
RBC CASTS # UR COMP ASSIST: 19 /HPF — HIGH (ref 0–4)
SALICYLATES SERPL-MCNC: <1.7 MG/DL — LOW (ref 2.8–20)
SALICYLATES SERPL-MCNC: <1.7 MG/DL — LOW (ref 2.8–20)
SODIUM SERPL-SCNC: 141 MMOL/L — SIGNIFICANT CHANGE UP (ref 135–145)
SODIUM SERPL-SCNC: 141 MMOL/L — SIGNIFICANT CHANGE UP (ref 135–145)
SP GR SPEC: 1.01 — SIGNIFICANT CHANGE UP (ref 1–1.03)
SP GR SPEC: 1.01 — SIGNIFICANT CHANGE UP (ref 1–1.03)
SQUAMOUS # UR AUTO: 8 /HPF — HIGH (ref 0–5)
SQUAMOUS # UR AUTO: 8 /HPF — HIGH (ref 0–5)
THC UR QL: NEGATIVE — SIGNIFICANT CHANGE UP
THC UR QL: NEGATIVE — SIGNIFICANT CHANGE UP
UROBILINOGEN FLD QL: 0.2 MG/DL — SIGNIFICANT CHANGE UP (ref 0.2–1)
UROBILINOGEN FLD QL: 0.2 MG/DL — SIGNIFICANT CHANGE UP (ref 0.2–1)
WBC # BLD: 6.64 K/UL — SIGNIFICANT CHANGE UP (ref 3.8–10.5)
WBC # BLD: 6.64 K/UL — SIGNIFICANT CHANGE UP (ref 3.8–10.5)
WBC # FLD AUTO: 6.64 K/UL — SIGNIFICANT CHANGE UP (ref 3.8–10.5)
WBC # FLD AUTO: 6.64 K/UL — SIGNIFICANT CHANGE UP (ref 3.8–10.5)
WBC UR QL: 3 /HPF — SIGNIFICANT CHANGE UP (ref 0–5)
WBC UR QL: 3 /HPF — SIGNIFICANT CHANGE UP (ref 0–5)

## 2023-10-19 PROCEDURE — 80307 DRUG TEST PRSMV CHEM ANLYZR: CPT

## 2023-10-19 PROCEDURE — 36415 COLL VENOUS BLD VENIPUNCTURE: CPT

## 2023-10-19 PROCEDURE — 87077 CULTURE AEROBIC IDENTIFY: CPT

## 2023-10-19 PROCEDURE — 87186 SC STD MICRODIL/AGAR DIL: CPT

## 2023-10-19 PROCEDURE — 87086 URINE CULTURE/COLONY COUNT: CPT

## 2023-10-19 PROCEDURE — 81001 URINALYSIS AUTO W/SCOPE: CPT

## 2023-10-19 PROCEDURE — 85025 COMPLETE CBC W/AUTO DIFF WBC: CPT

## 2023-10-19 PROCEDURE — 99284 EMERGENCY DEPT VISIT MOD MDM: CPT

## 2023-10-19 PROCEDURE — 80048 BASIC METABOLIC PNL TOTAL CA: CPT

## 2023-10-19 NOTE — ED ADULT NURSE NOTE - NSFALLHARMRISKINTERV_ED_ALL_ED

## 2023-10-19 NOTE — ED PROVIDER NOTE - PATIENT PORTAL LINK FT
You can access the FollowMyHealth Patient Portal offered by [facility] by registering at the following website: [email or web address]. By joining [facility]’s FollowMyHealth portal, you will also be able to view your health information using other applications (apps) compatible with our system.

## 2023-10-19 NOTE — ED ADULT NURSE NOTE - OBJECTIVE STATEMENT
Pt is 81yo f, A&Ox3 who presents to the ED with c/o altered mental status. As per pt's brother, pt's aid reported that pt was seeing things earlier this morning. Pt  denies auditory and visual hallucinations at this time. Pt reports mild SOB and fatigue that resolves with sitting at this time. Pt is awake, alert, responds to commands and does not appear to be in discomfort or distress at this time.   Denies chest pain, n/v/d,dizziness, numbness, tingling, dysuria, fever, chills, body aches.   20G IV placed in R wrist.   hx of parkinson's.

## 2023-10-19 NOTE — ED PROVIDER NOTE - NSFOLLOWUPINSTRUCTIONS_ED_ALL_ED_FT
Confusion  Confusion is the inability to think with your usual speed or clarity. Confusion can be caused by many things. People who are confused often describe their thinking as cloudy or unclear. Confusion can also include feeling disoriented. This means you are unaware of where you are or who you are. You may also not know the date or time. When confused, you may have trouble remembering, paying attention, or making decisions. Some people also act aggressively when they are confused.    In some cases, confusion may come on quickly. In other cases, it may develop slowly over time.    Confusion may be caused by medical conditions such as:  Infections, such as a urinary tract infection (UTI).  Low levels of oxygen, which can develop from conditions such as long-term lung disorders.  Decrease in brain function due to dementia and other conditions that affect the brain, such as seizures, strokes, brain tumors, or head injuries.  Mental health conditions, like panic attacks, anxiety, depression, and hallucinations.  Confusion may also be caused by physical factors such as:  Loss of fluid (dehydration) or an imbalance of salts and minerals in the body (electrolytes).  Lack of certain nutrients like niacin, thiamine, or other B vitamins.  Fever or hypothermia, which is a sudden drop in body temperature.  Low or high blood sugar.  Low or high blood pressure.  Other causes include:  Lack of sleep or changes in routine or surroundings, such as when traveling or staying in a hospital.  Using too much alcohol, drugs, or medicine.  Side effects of medicines, or taking medicines that affect other medicines (drug interactions).  Follow these instructions at home:  Pay attention to your symptoms. Tell your health care provider about any changes or if you develop new symptoms. Follow these instructions to control or treat symptoms. Ask a family member or friend for help if needed.    Medicines      Take over-the-counter and prescription medicines only as told by your health care provider.  Ask your health care provider about changing or stopping any medicines that may be causing your confusion.  Avoid pain medicines or sleep medicines until you have fully recovered.  Use a pillbox or an alarm to help you take the right medicines at the right time.  Lifestyle      Eat a balanced diet that includes fruits and vegetables.  Get enough sleep. For most adults, this is 7–9 hours each night.  Do not drink alcohol.  Do not become isolated. Spend time with other people and make plans for your days.  Do not drive until your health care provider says that it is safe to do so.  Do not use any products that contain nicotine or tobacco, such as cigarettes, e-cigarettes, and chewing tobacco. If you need help quitting, ask your health care provider.  Stop other activities that may increase your chances of getting hurt. These may include some work duties, sports activities, swimming, or bike riding. Ask your health care provider what activities are safe for you.  Tips for caregivers    Find out if the person is confused. Ask the person to state his or her name, age, and the date. If the person is unsure or answers incorrectly, he or she may be confused and need assistance.  Always introduce yourself, no matter how well the person knows you. Remind the person of his or her location.  Place a calendar and clock near the person who is confused. Keep a regular schedule. Make sure the person has plenty of light during the day and sleep at night.  Talk about current events and plans for the day.  Keep the environment calm, quiet, and peaceful.  Help the person do the things that he or she is unable to do. These include:  Taking medicines.  Keeping medical appointments.  Helping with household duties, including meal preparation.  Running errands.  Get help if you need it. There are several support groups for caregivers. If the person you are helping needs more support, consider day care, extended-care programs, or a skilled nursing facility. The person's health care provider may be able to help evaluate these options.  General instructions    Monitor yourself for any conditions you may have. These can include:  Checking your blood glucose levels if you have diabetes.  Maintaining a healthy weight.  Monitoring your blood pressure if you have hypertension.  Monitoring your body temperature if you have a fever.  Keep all follow-up visits. This is important.  Contact a health care provider if:  You have new symptoms or your symptoms get worse.  Get help right away if you:  Feel that you are not able to care for yourself.  Develop severe headaches, repeated vomiting, seizures, blackouts, or slurred speech.  Have increasing confusion, weakness, numbness, restlessness, or personality changes.  Develop a loss of balance, have marked dizziness, feel uncoordinated, or fall.  Develop severe anxiety, or you have delusions or hallucinations.  These symptoms may represent a serious problem that is an emergency. Do not wait to see if the symptoms will go away. Get medical help right away. Call your local emergency services (911 in the [place] Do not drive yourself to the hospital.    Summary  Confusion is the inability to think with your usual speed or clarity. People who are confused often describe their thinking as cloudy or unclear.  Confusion can also include having trouble remembering, paying attention, or making decisions.  Confusion may come on quickly or develop slowly over time, depending on the cause. There are many different causes of confusion.  Ask for help from family members or friends if you are unable to take care of yourself.  This information is not intended to replace advice given to you by your health care provider. Make sure you discuss any questions you have with your health care provider.    Document Revised: 04/13/2021 Document Reviewed: 04/13/2021

## 2023-10-19 NOTE — ED PROVIDER NOTE - OBJECTIVE STATEMENT
81 y/o female with PMHx of HTN, parkinsons disease presents to the ED after she states her brother in-law sent her in because they told her she was seeing things. Pt has no complaints at this time. Wishes to go back home.

## 2023-10-19 NOTE — ED ADULT TRIAGE NOTE - CHIEF COMPLAINT QUOTE
pt presents to ED from home with aide for altered mental status. LKW at 9 am today. aide states pt is "off". pt is awake, alert, following commands. denies any complaints. pt states she was being quiet today because she doesn't like talking very much.

## 2023-10-19 NOTE — ED PROVIDER NOTE - CLINICAL SUMMARY MEDICAL DECISION MAKING FREE TEXT BOX
Pt sent in for reported AMS, in the ED pt with no complaints. Spoke to patients brother, requesting labs, discussed likelihood low yield. Pt also has no aid at home currently so notes that he feels it is not safe discharge, will hold pt until safe dispo can be confirmed     [facility]- Pt sent in for reported AMS, in the ED pt with no complaints. Spoke to patients brother, requesting labs, discussed likelihood low yield. Pt also has no aid at home currently so notes that he feels it is not safe discharge, will hold pt until safe dispo can be confirmed     [facility]-    labs reviewed, no uti, no electrolyte abnormality, no anemia, urine with wbc 3

## 2023-10-19 NOTE — ED ADULT NURSE REASSESSMENT NOTE - NS ED NURSE REASSESS COMMENT FT1
Spoke with pt's brother Jose on phone. Pt's brother states he is unable to pick pt up until morning since he is out of town. MD Neumann notified and aware.

## 2023-10-20 VITALS
HEART RATE: 76 BPM | SYSTOLIC BLOOD PRESSURE: 165 MMHG | DIASTOLIC BLOOD PRESSURE: 78 MMHG | TEMPERATURE: 98 F | OXYGEN SATURATION: 95 %

## 2023-10-20 PROBLEM — F41.8 OTHER SPECIFIED ANXIETY DISORDERS: Chronic | Status: ACTIVE | Noted: 2023-05-17

## 2023-10-20 PROBLEM — E78.5 HYPERLIPIDEMIA, UNSPECIFIED: Chronic | Status: ACTIVE | Noted: 2023-05-17

## 2023-10-20 PROBLEM — F41.9 ANXIETY DISORDER, UNSPECIFIED: Chronic | Status: ACTIVE | Noted: 2023-05-17

## 2023-10-22 LAB
-  AMPICILLIN: SIGNIFICANT CHANGE UP
-  AMPICILLIN: SIGNIFICANT CHANGE UP
-  CIPROFLOXACIN: SIGNIFICANT CHANGE UP
-  CIPROFLOXACIN: SIGNIFICANT CHANGE UP
-  LEVOFLOXACIN: SIGNIFICANT CHANGE UP
-  LEVOFLOXACIN: SIGNIFICANT CHANGE UP
-  NITROFURANTOIN: SIGNIFICANT CHANGE UP
-  NITROFURANTOIN: SIGNIFICANT CHANGE UP
-  TETRACYCLINE: SIGNIFICANT CHANGE UP
-  TETRACYCLINE: SIGNIFICANT CHANGE UP
-  VANCOMYCIN: SIGNIFICANT CHANGE UP
-  VANCOMYCIN: SIGNIFICANT CHANGE UP
CULTURE RESULTS: SIGNIFICANT CHANGE UP
CULTURE RESULTS: SIGNIFICANT CHANGE UP
METHOD TYPE: SIGNIFICANT CHANGE UP
METHOD TYPE: SIGNIFICANT CHANGE UP
ORGANISM # SPEC MICROSCOPIC CNT: SIGNIFICANT CHANGE UP
SPECIMEN SOURCE: SIGNIFICANT CHANGE UP
SPECIMEN SOURCE: SIGNIFICANT CHANGE UP

## 2023-10-23 RX ORDER — LEVOFLOXACIN 5 MG/ML
1 INJECTION, SOLUTION INTRAVENOUS
Qty: 7 | Refills: 0
Start: 2023-10-23 | End: 2023-10-29

## 2023-10-23 NOTE — ED POST DISCHARGE NOTE - DETAILS
Pt with no symptoms but will treat with levaquin which pt is sensitive too. Pt will follow up with pmd.

## 2023-10-29 ENCOUNTER — INPATIENT (INPATIENT)
Facility: HOSPITAL | Age: 83
LOS: 2 days | Discharge: NURSING, REHAB OR LONG-TERM CARE | DRG: 56 | End: 2023-11-01
Attending: INTERNAL MEDICINE | Admitting: STUDENT IN AN ORGANIZED HEALTH CARE EDUCATION/TRAINING PROGRAM
Payer: MEDICARE

## 2023-10-29 VITALS
SYSTOLIC BLOOD PRESSURE: 159 MMHG | HEART RATE: 68 BPM | RESPIRATION RATE: 18 BRPM | HEIGHT: 63 IN | WEIGHT: 139.99 LBS | DIASTOLIC BLOOD PRESSURE: 84 MMHG | TEMPERATURE: 98 F | OXYGEN SATURATION: 96 %

## 2023-10-29 DIAGNOSIS — I10 ESSENTIAL (PRIMARY) HYPERTENSION: ICD-10-CM

## 2023-10-29 DIAGNOSIS — R53.1 WEAKNESS: ICD-10-CM

## 2023-10-29 DIAGNOSIS — G20.C PARKINSONISM, UNSPECIFIED: ICD-10-CM

## 2023-10-29 DIAGNOSIS — R29.6 REPEATED FALLS: ICD-10-CM

## 2023-10-29 LAB
A1C WITH ESTIMATED AVERAGE GLUCOSE RESULT: 5 % — SIGNIFICANT CHANGE UP (ref 4–5.6)
A1C WITH ESTIMATED AVERAGE GLUCOSE RESULT: 5 % — SIGNIFICANT CHANGE UP (ref 4–5.6)
ALBUMIN SERPL ELPH-MCNC: 3.1 G/DL — LOW (ref 3.3–5)
ALBUMIN SERPL ELPH-MCNC: 3.1 G/DL — LOW (ref 3.3–5)
ALBUMIN SERPL ELPH-MCNC: 3.2 G/DL — LOW (ref 3.3–5)
ALBUMIN SERPL ELPH-MCNC: 3.2 G/DL — LOW (ref 3.3–5)
ALP SERPL-CCNC: 88 U/L — SIGNIFICANT CHANGE UP (ref 40–120)
ALP SERPL-CCNC: 88 U/L — SIGNIFICANT CHANGE UP (ref 40–120)
ALP SERPL-CCNC: 92 U/L — SIGNIFICANT CHANGE UP (ref 40–120)
ALP SERPL-CCNC: 92 U/L — SIGNIFICANT CHANGE UP (ref 40–120)
ALT FLD-CCNC: 9 U/L — LOW (ref 12–78)
ALT FLD-CCNC: 9 U/L — LOW (ref 12–78)
ALT FLD-CCNC: <6 U/L — LOW (ref 12–78)
ALT FLD-CCNC: <6 U/L — LOW (ref 12–78)
ANION GAP SERPL CALC-SCNC: 5 MMOL/L — SIGNIFICANT CHANGE UP (ref 5–17)
ANION GAP SERPL CALC-SCNC: 5 MMOL/L — SIGNIFICANT CHANGE UP (ref 5–17)
ANION GAP SERPL CALC-SCNC: 6 MMOL/L — SIGNIFICANT CHANGE UP (ref 5–17)
ANION GAP SERPL CALC-SCNC: 6 MMOL/L — SIGNIFICANT CHANGE UP (ref 5–17)
APPEARANCE UR: CLEAR — SIGNIFICANT CHANGE UP
APPEARANCE UR: CLEAR — SIGNIFICANT CHANGE UP
AST SERPL-CCNC: 14 U/L — LOW (ref 15–37)
AST SERPL-CCNC: 14 U/L — LOW (ref 15–37)
AST SERPL-CCNC: 15 U/L — SIGNIFICANT CHANGE UP (ref 15–37)
AST SERPL-CCNC: 15 U/L — SIGNIFICANT CHANGE UP (ref 15–37)
BASOPHILS # BLD AUTO: 0.05 K/UL — SIGNIFICANT CHANGE UP (ref 0–0.2)
BASOPHILS # BLD AUTO: 0.05 K/UL — SIGNIFICANT CHANGE UP (ref 0–0.2)
BASOPHILS NFR BLD AUTO: 0.6 % — SIGNIFICANT CHANGE UP (ref 0–2)
BASOPHILS NFR BLD AUTO: 0.6 % — SIGNIFICANT CHANGE UP (ref 0–2)
BILIRUB SERPL-MCNC: 0.5 MG/DL — SIGNIFICANT CHANGE UP (ref 0.2–1.2)
BILIRUB SERPL-MCNC: 0.5 MG/DL — SIGNIFICANT CHANGE UP (ref 0.2–1.2)
BILIRUB SERPL-MCNC: 0.6 MG/DL — SIGNIFICANT CHANGE UP (ref 0.2–1.2)
BILIRUB SERPL-MCNC: 0.6 MG/DL — SIGNIFICANT CHANGE UP (ref 0.2–1.2)
BILIRUB UR-MCNC: NEGATIVE — SIGNIFICANT CHANGE UP
BILIRUB UR-MCNC: NEGATIVE — SIGNIFICANT CHANGE UP
BUN SERPL-MCNC: 17 MG/DL — SIGNIFICANT CHANGE UP (ref 7–23)
BUN SERPL-MCNC: 17 MG/DL — SIGNIFICANT CHANGE UP (ref 7–23)
BUN SERPL-MCNC: 21 MG/DL — SIGNIFICANT CHANGE UP (ref 7–23)
BUN SERPL-MCNC: 21 MG/DL — SIGNIFICANT CHANGE UP (ref 7–23)
CALCIUM SERPL-MCNC: 9.2 MG/DL — SIGNIFICANT CHANGE UP (ref 8.5–10.1)
CALCIUM SERPL-MCNC: 9.2 MG/DL — SIGNIFICANT CHANGE UP (ref 8.5–10.1)
CALCIUM SERPL-MCNC: 9.4 MG/DL — SIGNIFICANT CHANGE UP (ref 8.5–10.1)
CALCIUM SERPL-MCNC: 9.4 MG/DL — SIGNIFICANT CHANGE UP (ref 8.5–10.1)
CHLORIDE SERPL-SCNC: 107 MMOL/L — SIGNIFICANT CHANGE UP (ref 96–108)
CHLORIDE SERPL-SCNC: 107 MMOL/L — SIGNIFICANT CHANGE UP (ref 96–108)
CHLORIDE SERPL-SCNC: 108 MMOL/L — SIGNIFICANT CHANGE UP (ref 96–108)
CHLORIDE SERPL-SCNC: 108 MMOL/L — SIGNIFICANT CHANGE UP (ref 96–108)
CHOLEST SERPL-MCNC: 137 MG/DL — SIGNIFICANT CHANGE UP
CHOLEST SERPL-MCNC: 137 MG/DL — SIGNIFICANT CHANGE UP
CO2 SERPL-SCNC: 28 MMOL/L — SIGNIFICANT CHANGE UP (ref 22–31)
CO2 SERPL-SCNC: 28 MMOL/L — SIGNIFICANT CHANGE UP (ref 22–31)
CO2 SERPL-SCNC: 30 MMOL/L — SIGNIFICANT CHANGE UP (ref 22–31)
CO2 SERPL-SCNC: 30 MMOL/L — SIGNIFICANT CHANGE UP (ref 22–31)
COLOR SPEC: YELLOW — SIGNIFICANT CHANGE UP
COLOR SPEC: YELLOW — SIGNIFICANT CHANGE UP
CREAT SERPL-MCNC: 0.74 MG/DL — SIGNIFICANT CHANGE UP (ref 0.5–1.3)
CREAT SERPL-MCNC: 0.74 MG/DL — SIGNIFICANT CHANGE UP (ref 0.5–1.3)
CREAT SERPL-MCNC: 0.86 MG/DL — SIGNIFICANT CHANGE UP (ref 0.5–1.3)
CREAT SERPL-MCNC: 0.86 MG/DL — SIGNIFICANT CHANGE UP (ref 0.5–1.3)
DIFF PNL FLD: NEGATIVE — SIGNIFICANT CHANGE UP
DIFF PNL FLD: NEGATIVE — SIGNIFICANT CHANGE UP
EGFR: 68 ML/MIN/1.73M2 — SIGNIFICANT CHANGE UP
EGFR: 68 ML/MIN/1.73M2 — SIGNIFICANT CHANGE UP
EGFR: 82 ML/MIN/1.73M2 — SIGNIFICANT CHANGE UP
EGFR: 82 ML/MIN/1.73M2 — SIGNIFICANT CHANGE UP
EOSINOPHIL # BLD AUTO: 0.1 K/UL — SIGNIFICANT CHANGE UP (ref 0–0.5)
EOSINOPHIL # BLD AUTO: 0.1 K/UL — SIGNIFICANT CHANGE UP (ref 0–0.5)
EOSINOPHIL NFR BLD AUTO: 1.2 % — SIGNIFICANT CHANGE UP (ref 0–6)
EOSINOPHIL NFR BLD AUTO: 1.2 % — SIGNIFICANT CHANGE UP (ref 0–6)
ESTIMATED AVERAGE GLUCOSE: 97 MG/DL — SIGNIFICANT CHANGE UP (ref 68–114)
ESTIMATED AVERAGE GLUCOSE: 97 MG/DL — SIGNIFICANT CHANGE UP (ref 68–114)
GLUCOSE SERPL-MCNC: 85 MG/DL — SIGNIFICANT CHANGE UP (ref 70–99)
GLUCOSE SERPL-MCNC: 85 MG/DL — SIGNIFICANT CHANGE UP (ref 70–99)
GLUCOSE SERPL-MCNC: 89 MG/DL — SIGNIFICANT CHANGE UP (ref 70–99)
GLUCOSE SERPL-MCNC: 89 MG/DL — SIGNIFICANT CHANGE UP (ref 70–99)
GLUCOSE UR QL: NEGATIVE MG/DL — SIGNIFICANT CHANGE UP
GLUCOSE UR QL: NEGATIVE MG/DL — SIGNIFICANT CHANGE UP
HCT VFR BLD CALC: 36.2 % — SIGNIFICANT CHANGE UP (ref 34.5–45)
HCT VFR BLD CALC: 36.2 % — SIGNIFICANT CHANGE UP (ref 34.5–45)
HCT VFR BLD CALC: 37.3 % — SIGNIFICANT CHANGE UP (ref 34.5–45)
HCT VFR BLD CALC: 37.3 % — SIGNIFICANT CHANGE UP (ref 34.5–45)
HDLC SERPL-MCNC: 67 MG/DL — SIGNIFICANT CHANGE UP
HDLC SERPL-MCNC: 67 MG/DL — SIGNIFICANT CHANGE UP
HGB BLD-MCNC: 11.8 G/DL — SIGNIFICANT CHANGE UP (ref 11.5–15.5)
HGB BLD-MCNC: 11.8 G/DL — SIGNIFICANT CHANGE UP (ref 11.5–15.5)
HGB BLD-MCNC: 12.3 G/DL — SIGNIFICANT CHANGE UP (ref 11.5–15.5)
HGB BLD-MCNC: 12.3 G/DL — SIGNIFICANT CHANGE UP (ref 11.5–15.5)
IMM GRANULOCYTES NFR BLD AUTO: 0.2 % — SIGNIFICANT CHANGE UP (ref 0–0.9)
IMM GRANULOCYTES NFR BLD AUTO: 0.2 % — SIGNIFICANT CHANGE UP (ref 0–0.9)
KETONES UR-MCNC: NEGATIVE MG/DL — SIGNIFICANT CHANGE UP
KETONES UR-MCNC: NEGATIVE MG/DL — SIGNIFICANT CHANGE UP
LEUKOCYTE ESTERASE UR-ACNC: NEGATIVE — SIGNIFICANT CHANGE UP
LEUKOCYTE ESTERASE UR-ACNC: NEGATIVE — SIGNIFICANT CHANGE UP
LIPID PNL WITH DIRECT LDL SERPL: 56 MG/DL — SIGNIFICANT CHANGE UP
LIPID PNL WITH DIRECT LDL SERPL: 56 MG/DL — SIGNIFICANT CHANGE UP
LYMPHOCYTES # BLD AUTO: 1.27 K/UL — SIGNIFICANT CHANGE UP (ref 1–3.3)
LYMPHOCYTES # BLD AUTO: 1.27 K/UL — SIGNIFICANT CHANGE UP (ref 1–3.3)
LYMPHOCYTES # BLD AUTO: 14.8 % — SIGNIFICANT CHANGE UP (ref 13–44)
LYMPHOCYTES # BLD AUTO: 14.8 % — SIGNIFICANT CHANGE UP (ref 13–44)
MCHC RBC-ENTMCNC: 30.7 PG — SIGNIFICANT CHANGE UP (ref 27–34)
MCHC RBC-ENTMCNC: 30.7 PG — SIGNIFICANT CHANGE UP (ref 27–34)
MCHC RBC-ENTMCNC: 31.3 PG — SIGNIFICANT CHANGE UP (ref 27–34)
MCHC RBC-ENTMCNC: 31.3 PG — SIGNIFICANT CHANGE UP (ref 27–34)
MCHC RBC-ENTMCNC: 32.6 GM/DL — SIGNIFICANT CHANGE UP (ref 32–36)
MCHC RBC-ENTMCNC: 32.6 GM/DL — SIGNIFICANT CHANGE UP (ref 32–36)
MCHC RBC-ENTMCNC: 33 GM/DL — SIGNIFICANT CHANGE UP (ref 32–36)
MCHC RBC-ENTMCNC: 33 GM/DL — SIGNIFICANT CHANGE UP (ref 32–36)
MCV RBC AUTO: 94.3 FL — SIGNIFICANT CHANGE UP (ref 80–100)
MCV RBC AUTO: 94.3 FL — SIGNIFICANT CHANGE UP (ref 80–100)
MCV RBC AUTO: 94.9 FL — SIGNIFICANT CHANGE UP (ref 80–100)
MCV RBC AUTO: 94.9 FL — SIGNIFICANT CHANGE UP (ref 80–100)
MONOCYTES # BLD AUTO: 0.79 K/UL — SIGNIFICANT CHANGE UP (ref 0–0.9)
MONOCYTES # BLD AUTO: 0.79 K/UL — SIGNIFICANT CHANGE UP (ref 0–0.9)
MONOCYTES NFR BLD AUTO: 9.2 % — SIGNIFICANT CHANGE UP (ref 2–14)
MONOCYTES NFR BLD AUTO: 9.2 % — SIGNIFICANT CHANGE UP (ref 2–14)
NEUTROPHILS # BLD AUTO: 6.38 K/UL — SIGNIFICANT CHANGE UP (ref 1.8–7.4)
NEUTROPHILS # BLD AUTO: 6.38 K/UL — SIGNIFICANT CHANGE UP (ref 1.8–7.4)
NEUTROPHILS NFR BLD AUTO: 74 % — SIGNIFICANT CHANGE UP (ref 43–77)
NEUTROPHILS NFR BLD AUTO: 74 % — SIGNIFICANT CHANGE UP (ref 43–77)
NITRITE UR-MCNC: NEGATIVE — SIGNIFICANT CHANGE UP
NITRITE UR-MCNC: NEGATIVE — SIGNIFICANT CHANGE UP
NON HDL CHOLESTEROL: 70 MG/DL — SIGNIFICANT CHANGE UP
NON HDL CHOLESTEROL: 70 MG/DL — SIGNIFICANT CHANGE UP
PH UR: 7.5 — SIGNIFICANT CHANGE UP (ref 5–8)
PH UR: 7.5 — SIGNIFICANT CHANGE UP (ref 5–8)
PLATELET # BLD AUTO: 291 K/UL — SIGNIFICANT CHANGE UP (ref 150–400)
PLATELET # BLD AUTO: 291 K/UL — SIGNIFICANT CHANGE UP (ref 150–400)
PLATELET # BLD AUTO: 302 K/UL — SIGNIFICANT CHANGE UP (ref 150–400)
PLATELET # BLD AUTO: 302 K/UL — SIGNIFICANT CHANGE UP (ref 150–400)
POTASSIUM SERPL-MCNC: 3.6 MMOL/L — SIGNIFICANT CHANGE UP (ref 3.5–5.3)
POTASSIUM SERPL-MCNC: 3.6 MMOL/L — SIGNIFICANT CHANGE UP (ref 3.5–5.3)
POTASSIUM SERPL-MCNC: 3.7 MMOL/L — SIGNIFICANT CHANGE UP (ref 3.5–5.3)
POTASSIUM SERPL-MCNC: 3.7 MMOL/L — SIGNIFICANT CHANGE UP (ref 3.5–5.3)
POTASSIUM SERPL-SCNC: 3.6 MMOL/L — SIGNIFICANT CHANGE UP (ref 3.5–5.3)
POTASSIUM SERPL-SCNC: 3.6 MMOL/L — SIGNIFICANT CHANGE UP (ref 3.5–5.3)
POTASSIUM SERPL-SCNC: 3.7 MMOL/L — SIGNIFICANT CHANGE UP (ref 3.5–5.3)
POTASSIUM SERPL-SCNC: 3.7 MMOL/L — SIGNIFICANT CHANGE UP (ref 3.5–5.3)
PROT SERPL-MCNC: 6.3 GM/DL — SIGNIFICANT CHANGE UP (ref 6–8.3)
PROT UR-MCNC: NEGATIVE MG/DL — SIGNIFICANT CHANGE UP
PROT UR-MCNC: NEGATIVE MG/DL — SIGNIFICANT CHANGE UP
RBC # BLD: 3.84 M/UL — SIGNIFICANT CHANGE UP (ref 3.8–5.2)
RBC # BLD: 3.84 M/UL — SIGNIFICANT CHANGE UP (ref 3.8–5.2)
RBC # BLD: 3.93 M/UL — SIGNIFICANT CHANGE UP (ref 3.8–5.2)
RBC # BLD: 3.93 M/UL — SIGNIFICANT CHANGE UP (ref 3.8–5.2)
RBC # FLD: 13.2 % — SIGNIFICANT CHANGE UP (ref 10.3–14.5)
SODIUM SERPL-SCNC: 142 MMOL/L — SIGNIFICANT CHANGE UP (ref 135–145)
SP GR SPEC: 1.01 — SIGNIFICANT CHANGE UP (ref 1–1.03)
SP GR SPEC: 1.01 — SIGNIFICANT CHANGE UP (ref 1–1.03)
TRIGL SERPL-MCNC: 67 MG/DL — SIGNIFICANT CHANGE UP
TRIGL SERPL-MCNC: 67 MG/DL — SIGNIFICANT CHANGE UP
TROPONIN I, HIGH SENSITIVITY RESULT: 8.94 NG/L — SIGNIFICANT CHANGE UP
TROPONIN I, HIGH SENSITIVITY RESULT: 8.94 NG/L — SIGNIFICANT CHANGE UP
UROBILINOGEN FLD QL: 1 MG/DL — SIGNIFICANT CHANGE UP (ref 0.2–1)
UROBILINOGEN FLD QL: 1 MG/DL — SIGNIFICANT CHANGE UP (ref 0.2–1)
WBC # BLD: 7.6 K/UL — SIGNIFICANT CHANGE UP (ref 3.8–10.5)
WBC # BLD: 7.6 K/UL — SIGNIFICANT CHANGE UP (ref 3.8–10.5)
WBC # BLD: 8.61 K/UL — SIGNIFICANT CHANGE UP (ref 3.8–10.5)
WBC # BLD: 8.61 K/UL — SIGNIFICANT CHANGE UP (ref 3.8–10.5)
WBC # FLD AUTO: 7.6 K/UL — SIGNIFICANT CHANGE UP (ref 3.8–10.5)
WBC # FLD AUTO: 7.6 K/UL — SIGNIFICANT CHANGE UP (ref 3.8–10.5)
WBC # FLD AUTO: 8.61 K/UL — SIGNIFICANT CHANGE UP (ref 3.8–10.5)
WBC # FLD AUTO: 8.61 K/UL — SIGNIFICANT CHANGE UP (ref 3.8–10.5)

## 2023-10-29 PROCEDURE — 83036 HEMOGLOBIN GLYCOSYLATED A1C: CPT

## 2023-10-29 PROCEDURE — 93010 ELECTROCARDIOGRAM REPORT: CPT

## 2023-10-29 PROCEDURE — 99285 EMERGENCY DEPT VISIT HI MDM: CPT

## 2023-10-29 PROCEDURE — 36415 COLL VENOUS BLD VENIPUNCTURE: CPT

## 2023-10-29 PROCEDURE — 71045 X-RAY EXAM CHEST 1 VIEW: CPT | Mod: 26

## 2023-10-29 PROCEDURE — 85027 COMPLETE CBC AUTOMATED: CPT

## 2023-10-29 PROCEDURE — 97163 PT EVAL HIGH COMPLEX 45 MIN: CPT | Mod: GP

## 2023-10-29 PROCEDURE — 93005 ELECTROCARDIOGRAM TRACING: CPT

## 2023-10-29 PROCEDURE — 12345: CPT | Mod: NC

## 2023-10-29 PROCEDURE — 72125 CT NECK SPINE W/O DYE: CPT | Mod: 26,MA

## 2023-10-29 PROCEDURE — 80061 LIPID PANEL: CPT

## 2023-10-29 PROCEDURE — 70450 CT HEAD/BRAIN W/O DYE: CPT | Mod: 26,MA

## 2023-10-29 PROCEDURE — 99232 SBSQ HOSP IP/OBS MODERATE 35: CPT

## 2023-10-29 PROCEDURE — 97530 THERAPEUTIC ACTIVITIES: CPT | Mod: GP

## 2023-10-29 PROCEDURE — 97116 GAIT TRAINING THERAPY: CPT | Mod: GP

## 2023-10-29 PROCEDURE — 80053 COMPREHEN METABOLIC PANEL: CPT

## 2023-10-29 RX ORDER — HEPARIN SODIUM 5000 [USP'U]/ML
5000 INJECTION INTRAVENOUS; SUBCUTANEOUS EVERY 12 HOURS
Refills: 0 | Status: DISCONTINUED | OUTPATIENT
Start: 2023-10-29 | End: 2023-11-01

## 2023-10-29 RX ORDER — CARBIDOPA AND LEVODOPA 25; 100 MG/1; MG/1
1 TABLET ORAL DAILY
Refills: 0 | Status: DISCONTINUED | OUTPATIENT
Start: 2023-10-29 | End: 2023-10-30

## 2023-10-29 RX ORDER — METOPROLOL TARTRATE 50 MG
50 TABLET ORAL DAILY
Refills: 0 | Status: DISCONTINUED | OUTPATIENT
Start: 2023-10-29 | End: 2023-11-01

## 2023-10-29 RX ORDER — ATORVASTATIN CALCIUM 80 MG/1
10 TABLET, FILM COATED ORAL AT BEDTIME
Refills: 0 | Status: DISCONTINUED | OUTPATIENT
Start: 2023-10-29 | End: 2023-11-01

## 2023-10-29 RX ORDER — ONDANSETRON 8 MG/1
4 TABLET, FILM COATED ORAL EVERY 8 HOURS
Refills: 0 | Status: DISCONTINUED | OUTPATIENT
Start: 2023-10-29 | End: 2023-11-01

## 2023-10-29 RX ORDER — SODIUM CHLORIDE 9 MG/ML
1000 INJECTION INTRAMUSCULAR; INTRAVENOUS; SUBCUTANEOUS
Refills: 0 | Status: DISCONTINUED | OUTPATIENT
Start: 2023-10-29 | End: 2023-10-29

## 2023-10-29 RX ORDER — ASPIRIN/CALCIUM CARB/MAGNESIUM 324 MG
81 TABLET ORAL
Refills: 0 | DISCHARGE

## 2023-10-29 RX ORDER — ACETAMINOPHEN 500 MG
650 TABLET ORAL EVERY 6 HOURS
Refills: 0 | Status: DISCONTINUED | OUTPATIENT
Start: 2023-10-29 | End: 2023-11-01

## 2023-10-29 RX ORDER — CLOPIDOGREL BISULFATE 75 MG/1
75 TABLET, FILM COATED ORAL DAILY
Refills: 0 | Status: DISCONTINUED | OUTPATIENT
Start: 2023-10-29 | End: 2023-10-29

## 2023-10-29 RX ORDER — LANOLIN ALCOHOL/MO/W.PET/CERES
3 CREAM (GRAM) TOPICAL AT BEDTIME
Refills: 0 | Status: DISCONTINUED | OUTPATIENT
Start: 2023-10-29 | End: 2023-11-01

## 2023-10-29 RX ORDER — LISINOPRIL 2.5 MG/1
20 TABLET ORAL DAILY
Refills: 0 | Status: DISCONTINUED | OUTPATIENT
Start: 2023-10-29 | End: 2023-11-01

## 2023-10-29 RX ORDER — CARBIDOPA AND LEVODOPA 25; 100 MG/1; MG/1
1 TABLET ORAL
Refills: 0 | DISCHARGE

## 2023-10-29 RX ADMIN — HEPARIN SODIUM 5000 UNIT(S): 5000 INJECTION INTRAVENOUS; SUBCUTANEOUS at 17:45

## 2023-10-29 RX ADMIN — SODIUM CHLORIDE 70 MILLILITER(S): 9 INJECTION INTRAMUSCULAR; INTRAVENOUS; SUBCUTANEOUS at 09:48

## 2023-10-29 RX ADMIN — ATORVASTATIN CALCIUM 10 MILLIGRAM(S): 80 TABLET, FILM COATED ORAL at 21:45

## 2023-10-29 RX ADMIN — Medication 50 MILLIGRAM(S): at 09:49

## 2023-10-29 RX ADMIN — LISINOPRIL 20 MILLIGRAM(S): 2.5 TABLET ORAL at 09:49

## 2023-10-29 RX ADMIN — CARBIDOPA AND LEVODOPA 1 TABLET(S): 25; 100 TABLET ORAL at 09:49

## 2023-10-29 RX ADMIN — Medication 1 TABLET(S): at 09:48

## 2023-10-29 NOTE — H&P ADULT - ASSESSMENT
82-year-old female with history of Parkinson's, HLD, HTN presents to the ED after multiple falls at home, had 2 falls yesterday. Patients physical state has been declining, increase in weakness and Pakinson's symptoms have been worsening. Endorses pain on the back of the head secondary from the fall. CT-Head: No acute intracranial hemorrhage, mass effect, or acute osseous fracture. Labs unremarkable. BP:159/84, HR:68, afebrile, saturating 100% in room air.   Patient is being admitted for PT and placement of possible nursing home due to worsening of her medical condition since she lives alone.

## 2023-10-29 NOTE — ED ADULT NURSE NOTE - OBJECTIVE STATEMENT
Patient BIB EMS from home s/p fall. Pt on Plavix twice today c/o head pain.  Hx of parkinsons.  Felt like she couldn't feel her legs.

## 2023-10-29 NOTE — PHYSICAL THERAPY INITIAL EVALUATION ADULT - PLANNED THERAPY INTERVENTIONS, PT EVAL
Eval, amb, transfers, bed mobility x 15'. Pt left OOB in chair with all observation section equipment/material intact and pad alarm intact/activated. PAINAD- 0/10. Will cont to follow pt and increase as tolerated./bed mobility training/gait training/ROM/strengthening/transfer training

## 2023-10-29 NOTE — PHYSICAL THERAPY INITIAL EVALUATION ADULT - ADDITIONAL COMMENTS
Has a Rolling Walker but does not use Lives in home with all rooms on the first floor. Brother and sister in law live in the same complex. Aide assists her 26 hours per week. +Driving very infrequently. Info obtained from [employer] 5/2023. Has a Rolling Walker but does not use Lives in home with all rooms on the first floor. Brother and sister in law live in the same complex. Aide assists her 26 hours per week. +Driving very infrequently. Info obtained from eval 5/2023. Update 10/29/2023: Pt lives at [facility]

## 2023-10-29 NOTE — ED PROVIDER NOTE - CLINICAL SUMMARY MEDICAL DECISION MAKING FREE TEXT BOX
82-year-old female with progressive decline and multiple falls with increased weakness.  Will obtain labs to rule out other etiology of weakness aside from progressive Parkinson's.  Neuro alert activated upon arrival for complaint of head pain and fall.  CT imaging and x-rays noted as well as labs.  Will admit for weakness. Signout given to Hospitalist Dr. Galvez

## 2023-10-29 NOTE — ED ADULT TRIAGE NOTE - CHIEF COMPLAINT QUOTE
Patient fell on plavix twice today c/o head pain.  Hx of parkinsons.  Felt like she couldn't feel her legs.

## 2023-10-29 NOTE — ED ADULT NURSE NOTE - NSFALLHARMRISKINTERV_ED_ALL_ED

## 2023-10-29 NOTE — H&P ADULT - PROBLEM SELECTOR PLAN 1
Multiple episodes of fall  Fell twice yesterday  - PT eval   - /case management   - Fall precaution  - DVT prophylaxis

## 2023-10-29 NOTE — ED PROVIDER NOTE - PHYSICAL EXAMINATION
***GEN - frail, chronically ill appearing; A+O x2-3 ***HEAD - NC/AT, +ttp over back of head ***EYES/NOSE - PERRL, EOMI, mucous membranes moist, no discharge ***THROAT: Oral cavity and pharynx normal. No inflammation, swelling, exudate, or lesions.  ***NECK: Neck supple,   ***PULMONARY - CTA b/l, symmetric breath sounds. ***CARDIAC -s1s2, RRR, no M,G,R  ***ABDOMEN - +BS, ND, NT  ***BACK - no CVA tenderness, Normal  spine ***EXTREMITIES - symmetric pulses, 2+ dp, capillary refill < 2 seconds  ***SKIN - no rash or bruising   ***NEUROLOGIC - alert, CN 2-12 intact, no focal deficits.

## 2023-10-29 NOTE — PATIENT PROFILE ADULT - FALL HARM RISK - HARM RISK INTERVENTIONS

## 2023-10-29 NOTE — PHYSICAL THERAPY INITIAL EVALUATION ADULT - ACTIVE RANGE OF MOTION EXAMINATION, REHAB EVAL
Bilateral shoulder flex ~ 100 degrees, bilateral hip flex ~ 90 degrees, and bilateral DF neutral./Left UE Active ROM was WFL (within functional limits)/Right UE Active ROM was WFL (within functional limits)/Left LE Active ROM was WFL (within functional limits)/Right LE Active ROM was WFL (within functional limits)

## 2023-10-29 NOTE — PHYSICAL THERAPY INITIAL EVALUATION ADULT - GENERAL OBSERVATIONS, REHAB EVAL
Pt found supine in bed with bed alarm activated. Pt with no c/o pain. PAINAD- 0/10. Pt found supine in bed with bed alarm activated. Pt with no c/o pain. PAINAD- 0/10. Pt also noted to have increase thoracic kyphosis, mod FHP, and difficulty keeping head upright.

## 2023-10-29 NOTE — H&P ADULT - NSHPLABSRESULTS_GEN_ALL_CORE
12.3   8.61  )-----------( 291      ( 29 Oct 2023 04:25 )             37.3     10-29    142  |  107  |  21  ----------------------------<  85  3.7   |  30  |  0.86    Ca    9.2      29 Oct 2023 04:25    TPro  6.3  /  Alb  3.2<L>  /  TBili  0.5  /  DBili  x   /  AST  14<L>  /  ALT  9<L>  /  AlkPhos  92  10-29      Urinalysis Basic - ( 29 Oct 2023 04:25 )    Color: x / Appearance: x / SG: x / pH: x  Gluc: 85 mg/dL / Ketone: x  / Bili: x / Urobili: x   Blood: x / Protein: x / Nitrite: x   Leuk Esterase: x / RBC: x / WBC x   Sq Epi: x / Non Sq Epi: x / Bacteria: x      CT-cervical: No acute cervical spine fracture or evidence of traumatic malalignment. Mild cervical spondylosis.    CT-Head: No acute intracranial hemorrhage, mass effect, or acute osseous fracture

## 2023-10-29 NOTE — PATIENT PROFILE ADULT - FUNCTIONAL ASSESSMENT - BASIC MOBILITY 6.
1-calculated by average/Not able to assess (calculate score using [facility] averaging method)

## 2023-10-29 NOTE — H&P ADULT - NSHPPHYSICALEXAM_GEN_ALL_CORE
Gen:· AAOx2, NAD, Frail   ENMT: Airway patent, Nasal mucosa clear. Mouth with normal mucosa.   EYES: Clear bilaterally, pupils equal, round and reactive to light.  HEAD: tenderness over the occipital area   CARDIO: RRR,  Heart sounds S1, S2.  No murmurs, rubs or gallops.  RESP: Clear  breath sound in all fields No increase work of breathing,   GI: +BS, soft, non-tender, no guarding. nondistended, No CVA-Tenderness   MUSK: Spine appears normal, range of motion is not limited, no muscle or joint tenderness  SKIN: Skin normal color for race, warm, dry and intact. No evidence of rash.

## 2023-10-29 NOTE — ED PROVIDER NOTE - OBJECTIVE STATEMENT
82-year-old female with history of Parkinson's, hyperlipidemia, hypertension presents after multiple falls at home.  Patient has been having worsening Parkinson symptoms and much weaker and declining.  Fell twice yesterday.  Reports some pain to the back of her head.  Neuro alert activated upon arrival.

## 2023-10-30 LAB
CULTURE RESULTS: SIGNIFICANT CHANGE UP
CULTURE RESULTS: SIGNIFICANT CHANGE UP
SPECIMEN SOURCE: SIGNIFICANT CHANGE UP
SPECIMEN SOURCE: SIGNIFICANT CHANGE UP

## 2023-10-30 PROCEDURE — 99223 1ST HOSP IP/OBS HIGH 75: CPT

## 2023-10-30 PROCEDURE — 99232 SBSQ HOSP IP/OBS MODERATE 35: CPT

## 2023-10-30 RX ORDER — POLYETHYLENE GLYCOL 3350 17 G/17G
17 POWDER, FOR SOLUTION ORAL DAILY
Refills: 0 | Status: DISCONTINUED | OUTPATIENT
Start: 2023-10-30 | End: 2023-11-01

## 2023-10-30 RX ORDER — CARBIDOPA AND LEVODOPA 25; 100 MG/1; MG/1
1 TABLET ORAL
Refills: 0 | Status: DISCONTINUED | OUTPATIENT
Start: 2023-10-30 | End: 2023-11-01

## 2023-10-30 RX ORDER — CARBIDOPA AND LEVODOPA 25; 100 MG/1; MG/1
1 TABLET ORAL DAILY
Refills: 0 | Status: DISCONTINUED | OUTPATIENT
Start: 2023-10-30 | End: 2023-10-30

## 2023-10-30 RX ORDER — SENNA PLUS 8.6 MG/1
2 TABLET ORAL AT BEDTIME
Refills: 0 | Status: DISCONTINUED | OUTPATIENT
Start: 2023-10-30 | End: 2023-11-01

## 2023-10-30 RX ADMIN — HEPARIN SODIUM 5000 UNIT(S): 5000 INJECTION INTRAVENOUS; SUBCUTANEOUS at 05:27

## 2023-10-30 RX ADMIN — LISINOPRIL 20 MILLIGRAM(S): 2.5 TABLET ORAL at 10:15

## 2023-10-30 RX ADMIN — ATORVASTATIN CALCIUM 10 MILLIGRAM(S): 80 TABLET, FILM COATED ORAL at 20:52

## 2023-10-30 RX ADMIN — Medication 50 MILLIGRAM(S): at 10:15

## 2023-10-30 RX ADMIN — CARBIDOPA AND LEVODOPA 1 TABLET(S): 25; 100 TABLET ORAL at 10:15

## 2023-10-30 RX ADMIN — Medication 1 TABLET(S): at 10:15

## 2023-10-30 RX ADMIN — CARBIDOPA AND LEVODOPA 1 TABLET(S): 25; 100 TABLET ORAL at 20:52

## 2023-10-30 RX ADMIN — SENNA PLUS 2 TABLET(S): 8.6 TABLET ORAL at 20:52

## 2023-10-30 RX ADMIN — Medication 3 MILLIGRAM(S): at 20:52

## 2023-10-30 RX ADMIN — POLYETHYLENE GLYCOL 3350 17 GRAM(S): 17 POWDER, FOR SOLUTION ORAL at 17:18

## 2023-10-30 RX ADMIN — HEPARIN SODIUM 5000 UNIT(S): 5000 INJECTION INTRAVENOUS; SUBCUTANEOUS at 20:53

## 2023-10-30 NOTE — DIETITIAN INITIAL EVALUATION ADULT - ORAL INTAKE PTA/DIET HISTORY
Lives at home by herself, able to cook however only prepares ready to eat meals (typically frozen meals). Reports of fair appetite PTA, states PO intake has decreased however unable to specify when it started to decrease.

## 2023-10-30 NOTE — DIETITIAN INITIAL EVALUATION ADULT - ADD RECOMMEND
1) Suggest SLP eval to determine appropriate consistency pt can tolerate. Please provide assistance w/ tray set up and opening containers  2) Encourage protein-rich foods, maximize food preferences   3) Ensure + HP shake BID to optimize nutritional needs (provides 350 kcal, 20g protein/ shake)   4) Monitor bowel movements, if no BM for >3 days, consider implementing bowel regimen.   5) C/w MVI w/ minerals daily to ensure 100% RDA met   6) Consider adding thiamine 100 mg daily 2/2 malnutrition   7) Obtain weekly wt to track/trend changes   8) Monitor daily lytes/min and replete prn   9) Confirm goals of care regarding nutrition support. Nutrition support is not recommended due to overall declining medical status which evidenced based studies indicate EN is not effective in prolonging survival and improving quality of life. It can also increase risk of aspiration pneumonia as well as other related issues (infection, GI complications, and worsening/ non-healing PI's). However, will provide nutrition/ hydration within GOC.   RD will continue to monitor PO intake, labs, hydration, and wt prn.

## 2023-10-30 NOTE — CONSULT NOTE ADULT - SUBJECTIVE AND OBJECTIVE BOX
CC: 82 y old  Female who presents with a chief complaint of Weakness      HPI:  82-year-old F with history of Anxiety with depression, Parkinson's, HLD, HTN presents to the ED after multiple falls at home, had 2 falls yesterday, C/O pain on the back of the head secondary from the fall. CT Head and cervical spine - no acute intracranial hemorrhage, mass effect, or acute osseous fracture.    Patient lives alone, she reports she has followed up with Dr. Hammer the neurologist for Parkinson's disease, she admits that her posture and gait and general functioning have been declining, she has tremors of left more than right hands, she is is however mentally alert and is able to answer questions, most of the answers are little vague.  .    Patient is being admitted for PT and placement of possible nursing home.      PAST MEDICAL & SURGICAL HISTORY:  HTN (hypertension)  HLD (hyperlipidemia)  Anxiety with depression      FAMILY HISTORY:  Unable to obtain      Social Hx:  Nonsmoker, no drug or alcohol use      MEDICATIONS  (STANDING):  atorvastatin 10 milliGRAM(s) Oral at bedtime  carbidopa/levodopa  25/100 1 Tablet(s) Oral daily  heparin   Injectable 5000 Unit(s) SubCutaneous every 12 hours  lisinopril 20 milliGRAM(s) Oral daily  metoprolol succinate ER 50 milliGRAM(s) Oral daily  multivitamin 1 Tablet(s) Oral daily  polyethylene glycol 3350 17 Gram(s) Oral daily  senna 2 Tablet(s) Oral at bedtime       Allergies  No Known Allergies      ROS: Pertinent positives in HPI, all other ROS were reviewed and are negative.        Vital Signs Last 24 Hrs  T(C): 37.1 (30 Oct 2023 07:45), Max: 37.1 (29 Oct 2023 21:45)  T(F): 98.8 (30 Oct 2023 07:45), Max: 98.8 (29 Oct 2023 21:45)  HR: 79 (30 Oct 2023 07:45) (76 - 79)  BP: 128/50 (30 Oct 2023 07:45) (128/50 - 139/58)  BP(mean): --  RR: 18 (30 Oct 2023 07:45) (18 - 18)  SpO2: 97% (30 Oct 2023 07:45) (97% - 99%)    Parameters below as of 30 Oct 2023 07:45  Patient On (Oxygen Delivery Method): room air      Gen exam:  Normocephalic, frail -looking, awake and alert.  HEENT: PERRLA, EOMI,   Neck: Supple.  Respiratory: Breath sounds are clear bilaterally  Cardiovascular: S1 and S2, regular  Extremities:  no edema  Vascular: Caritid Bruit - no  Musculoskeletal: joint stiffness  Skin: No rashes      Neurological exam:  HF: A x O x self, Month and year, interactive, follows simple commands, speech is fluent mildly hypophonic, no dysarthria no aphasia.   CN: DAI, EOMI, VFF, no NLFD, tongue midline  Motor: No pronator drift, Decreased muscle bulk, hypertonia and rigidity with bradykinesia left upper and lower extremities more than right, intermittent rest tremors left hand  Sens: Intact to light touch   Reflexes: Symmetric, Hyporeflexic, downgoing toes b/l  Coord:  No FNFA,  Gait/Balance: Cannot test      Labs:   10-29    142  |  108  |  17  ----------------------------<  89  3.6   |  28  |  0.74    Ca    9.4      29 Oct 2023 10:41    TPro  6.3  /  Alb  3.1<L>  /  TBili  0.6  /  DBili  x   /  AST  15  /  ALT  <6<L>  /  AlkPhos  88  10-29    10-29 Chol 137 LDL -- HDL 67 Trig 67                          11.8   7.60  )-----------( 302      ( 29 Oct 2023 10:41 )             36.2       Radiology:  -< from: CT Head No Cont (10.29.23 @ 02:40) >  IMPRESSION:    No acute intracranial hemorrhage, mass effect, or acute osseous fracture.    < from: CT Cervical Spine No Cont (10.29.23 @ 02:41) >  IMPRESSION:    No acute cervical spine fracture or evidence of traumatic malalignment.   Mild cervical spondylosis.

## 2023-10-30 NOTE — DIETITIAN NUTRITION RISK NOTIFICATION - ADDITIONAL COMMENTS/DIETITIAN RECOMMENDATIONS
Pt BIB EMS with C/O LLQ pain since 1630 , Pt began vomiting shortly after. Pt given 1 Versed, 4 Zofran, 100 Fentanyl PTA. Pt C/O vomiting associated with the pain.   
1) Suggest SLP eval to determine appropriate consistency pt can tolerate. Please provide assistance w/ tray set up and opening containers  2) Encourage protein-rich foods, maximize food preferences   3) Ensure + HP shake BID to optimize nutritional needs (provides 350 kcal, 20g protein/ shake)   4) Monitor bowel movements, if no BM for >3 days, consider implementing bowel regimen.   5) C/w MVI w/ minerals daily to ensure 100% RDA met   6) Consider adding thiamine 100 mg daily 2/2 malnutrition   7) Obtain weekly wt to track/trend changes   8) Monitor daily lytes/min and replete prn   9) Confirm goals of care regarding nutrition support. Nutrition support is not recommended due to overall declining medical status which evidenced based studies indicate EN is not effective in prolonging survival and improving quality of life. It can also increase risk of aspiration pneumonia as well as other related issues (infection, GI complications, and worsening/ non-healing PI's). However, will provide nutrition/ hydration within GOC.   RD will continue to monitor PO intake, labs, hydration, and wt prn.

## 2023-10-30 NOTE — DIETITIAN INITIAL EVALUATION ADULT - PERTINENT LABORATORY DATA
10-29    142  |  108  |  17  ----------------------------<  89  3.6   |  28  |  0.74    Ca    9.4      29 Oct 2023 10:41    TPro  6.3  /  Alb  3.1<L>  /  TBili  0.6  /  DBili  x   /  AST  15  /  ALT  <6<L>  /  AlkPhos  88  10-29  A1C with Estimated Average Glucose Result: 5.0 % (10-29-23 @ 10:41)

## 2023-10-30 NOTE — DIETITIAN INITIAL EVALUATION ADULT - PERTINENT MEDS FT
MEDICATIONS  (STANDING):  atorvastatin 10 milliGRAM(s) Oral at bedtime  carbidopa/levodopa  25/100 1 Tablet(s) Oral daily  heparin   Injectable 5000 Unit(s) SubCutaneous every 12 hours  lisinopril 20 milliGRAM(s) Oral daily  metoprolol succinate ER 50 milliGRAM(s) Oral daily  multivitamin 1 Tablet(s) Oral daily  polyethylene glycol 3350 17 Gram(s) Oral daily  senna 2 Tablet(s) Oral at bedtime    MEDICATIONS  (PRN):  acetaminophen     Tablet .. 650 milliGRAM(s) Oral every 6 hours PRN Temp greater or equal to 38C (100.4F), Mild Pain (1 - 3)  aluminum hydroxide/magnesium hydroxide/simethicone Suspension 30 milliLiter(s) Oral every 4 hours PRN Dyspepsia  melatonin 3 milliGRAM(s) Oral at bedtime PRN Insomnia  ondansetron Injectable 4 milliGRAM(s) IV Push every 8 hours PRN Nausea and/or Vomiting

## 2023-10-30 NOTE — DIETITIAN INITIAL EVALUATION ADULT - OTHER INFO
82-year-old female with history of Parkinson's, HLD, HTN presents to the ED after multiple falls at home, had 2 falls yesterday. Patients physical state has been declining, increase in weakness and Pakinson's symptoms have been worsening. Endorses pain on the back of the head secondary from the fall. CT-Head: No acute intracranial hemorrhage, mass effect, or acute osseous fracture. Labs unremarkable.     Currently on easy to chew diet, ?reasoning as pt denies difficulty w/ chewing and swallowing. Received breakfast tray upon assessment, RD assisted w/ tray set up and cutting up food, able to feed herself ok. UBW stated at 105# x "couple months ago" and endorses wt loss since, stating CBW at 95#; 10# wt loss/ 9.52% x unknown specific time frame. Wt doc'd in EMR at 101#. NFPE reveals severe muscle/fat wasting - appeared very thin, frail, milton. Will add ensure + HP BID to optimize nutrient needs. Please assist pt w/ feeding and tray set-up. Also suggest SLP eval to determine appropriate diet consistency she can tolerate. See additional recommendations below.

## 2023-10-31 PROCEDURE — 99233 SBSQ HOSP IP/OBS HIGH 50: CPT

## 2023-10-31 RX ORDER — SODIUM CHLORIDE 9 MG/ML
1000 INJECTION, SOLUTION INTRAVENOUS
Refills: 0 | Status: DISCONTINUED | OUTPATIENT
Start: 2023-10-31 | End: 2023-10-31

## 2023-10-31 RX ORDER — SODIUM CHLORIDE 9 MG/ML
1000 INJECTION, SOLUTION INTRAVENOUS
Refills: 0 | Status: DISCONTINUED | OUTPATIENT
Start: 2023-10-31 | End: 2023-11-01

## 2023-10-31 RX ADMIN — SODIUM CHLORIDE 100 MILLILITER(S): 9 INJECTION, SOLUTION INTRAVENOUS at 16:02

## 2023-10-31 RX ADMIN — CARBIDOPA AND LEVODOPA 1 TABLET(S): 25; 100 TABLET ORAL at 20:31

## 2023-10-31 RX ADMIN — SENNA PLUS 2 TABLET(S): 8.6 TABLET ORAL at 20:31

## 2023-10-31 RX ADMIN — POLYETHYLENE GLYCOL 3350 17 GRAM(S): 17 POWDER, FOR SOLUTION ORAL at 09:47

## 2023-10-31 RX ADMIN — HEPARIN SODIUM 5000 UNIT(S): 5000 INJECTION INTRAVENOUS; SUBCUTANEOUS at 09:59

## 2023-10-31 RX ADMIN — ATORVASTATIN CALCIUM 10 MILLIGRAM(S): 80 TABLET, FILM COATED ORAL at 20:31

## 2023-10-31 RX ADMIN — SODIUM CHLORIDE 60 MILLILITER(S): 9 INJECTION, SOLUTION INTRAVENOUS at 12:08

## 2023-10-31 RX ADMIN — Medication 650 MILLIGRAM(S): at 18:00

## 2023-10-31 RX ADMIN — HEPARIN SODIUM 5000 UNIT(S): 5000 INJECTION INTRAVENOUS; SUBCUTANEOUS at 20:31

## 2023-10-31 RX ADMIN — Medication 650 MILLIGRAM(S): at 18:26

## 2023-10-31 RX ADMIN — CARBIDOPA AND LEVODOPA 1 TABLET(S): 25; 100 TABLET ORAL at 09:57

## 2023-10-31 RX ADMIN — Medication 1 TABLET(S): at 10:03

## 2023-10-31 RX ADMIN — Medication 3 MILLIGRAM(S): at 20:32

## 2023-10-31 NOTE — PROGRESS NOTE ADULT - NUTRITIONAL ASSESSMENT
This patient has been assessed with a concern for Malnutrition and has been determined to have a diagnosis/diagnoses of Severe protein-calorie malnutrition and Underweight (BMI < 19).    This patient is being managed with:   Diet Easy to Chew-  Entered: Oct 29 2023  3:39AM  
This patient has been assessed with a concern for Malnutrition and has been determined to have a diagnosis/diagnoses of Severe protein-calorie malnutrition and Underweight (BMI < 19).    This patient is being managed with:   Diet Easy to Chew-  Entered: Oct 29 2023  3:39AM

## 2023-10-31 NOTE — PROGRESS NOTE ADULT - SUBJECTIVE AND OBJECTIVE BOX
10/29: says she feels uncomfortable in bed  admitted with recurrent falls    10/30: no complaints    10/31: No complaints  remains AMS as yesterday  BP on borderline; iv fluids  poor po intake  BP meds held    PHYSICAL EXAM:    Daily Height in cm: 160.02 (29 Oct 2023 02:05)    Daily     Vital Signs Last 24 Hrs  T(C): 36.7 (31 Oct 2023 15:46), Max: 36.9 (30 Oct 2023 23:17)  T(F): 98 (31 Oct 2023 15:46), Max: 98.4 (30 Oct 2023 23:17)  HR: 95 (31 Oct 2023 15:46) (54 - 95)  BP: 95/62 (31 Oct 2023 15:46) (92/34 - 154/76)  BP(mean): --  RR: 18 (31 Oct 2023 15:46) (17 - 18)  SpO2: 97% (31 Oct 2023 15:46) (94% - 97%)    Parameters below as of 31 Oct 2023 15:46  Patient On (Oxygen Delivery Method): room air          Constitutional: Weak and ill appearing  HEENT: Atraumatic, RAKESH,   Respiratory: Breath Sounds normal, no rhonchi/wheeze  Cardiovascular: N S1S2;   Gastrointestinal: Abdomen soft, non tender, Bowel Sounds present  Extremities: No edema, peripheral pulses present  Neurological: AAO x1, no gross focal motor deficits  Skin: Non cellulitic, no rash, ulcers  Lymph Nodes: No lymphadenopathy noted  Back: No CVA tenderness   Musculoskeletal: non tender  Breasts: Deferred  Genitourinary: deferred  Rectal: Deferred    All Labs/EKG/Radiology/Meds reviewed      MICROBIOLOGY/CULTURES:  Culture Results:   <10,000 CFU/mL Normal Urogenital Jeimy (10-29 @ 03:04)                                  11.8   7.60  )-----------( 302      ( 29 Oct 2023 10:41 )             36.2       CBC Full  -  ( 29 Oct 2023 10:41 )  WBC Count : 7.60 K/uL  RBC Count : 3.84 M/uL  Hemoglobin : 11.8 g/dL  Hematocrit : 36.2 %  Platelet Count - Automated : 302 K/uL  Mean Cell Volume : 94.3 fl  Mean Cell Hemoglobin : 30.7 pg  Mean Cell Hemoglobin Concentration : 32.6 gm/dL  Auto Neutrophil # : x  Auto Lymphocyte # : x  Auto Monocyte # : x  Auto Eosinophil # : x  Auto Basophil # : x  Auto Neutrophil % : x  Auto Lymphocyte % : x  Auto Monocyte % : x  Auto Eosinophil % : x  Auto Basophil % : x      10-29    142  |  108  |  17  ----------------------------<  89  3.6   |  28  |  0.74    Ca    9.4      29 Oct 2023 10:41    TPro  6.3  /  Alb  3.1<L>  /  TBili  0.6  /  DBili  x   /  AST  15  /  ALT  <6<L>  /  AlkPhos  88  10-29      LIVER FUNCTIONS - ( 29 Oct 2023 10:41 )  Alb: 3.1 g/dL / Pro: 6.3 gm/dL / ALK PHOS: 88 U/L / ALT: <6 U/L / AST: 15 U/L / GGT: x               Urinalysis Basic - ( 29 Oct 2023 10:41 )    Color: x / Appearance: x / SG: x / pH: x  Gluc: 89 mg/dL / Ketone: x  / Bili: x / Urobili: x   Blood: x / Protein: x / Nitrite: x   Leuk Esterase: x / RBC: x / WBC x   Sq Epi: x / Non Sq Epi: x / Bacteria: x      < from: Xray Chest 1 View- PORTABLE-Urgent (10.29.23 @ 05:18) >  IMPRESSION: No gross consolidation is seen.    < end of copied text >  < from: CT Cervical Spine No Cont (10.29.23 @ 02:41) >  No acute cervical spine fracture or evidence of traumatic malalignment.   Mild cervical spondylosis.    < end of copied text >  < from: CT Head No Cont (10.29.23 @ 02:40) >  No acute intracranial hemorrhage, mass effect, or acute osseous fracture.    < end of copied text >    EKG: NSR    MEDICATIONS  (STANDING):  atorvastatin 10 milliGRAM(s) Oral at bedtime  carbidopa/levodopa  25/100 1 Tablet(s) Oral <User Schedule>  dextrose 5% + sodium chloride 0.9%. 1000 milliLiter(s) (100 mL/Hr) IV Continuous <Continuous>  heparin   Injectable 5000 Unit(s) SubCutaneous every 12 hours  lisinopril 20 milliGRAM(s) Oral daily  metoprolol succinate ER 50 milliGRAM(s) Oral daily  multivitamin 1 Tablet(s) Oral daily  polyethylene glycol 3350 17 Gram(s) Oral daily  senna 2 Tablet(s) Oral at bedtime    MEDICATIONS  (PRN):  acetaminophen     Tablet .. 650 milliGRAM(s) Oral every 6 hours PRN Temp greater or equal to 38C (100.4F), Mild Pain (1 - 3)  aluminum hydroxide/magnesium hydroxide/simethicone Suspension 30 milliLiter(s) Oral every 4 hours PRN Dyspepsia  melatonin 3 milliGRAM(s) Oral at bedtime PRN Insomnia  ondansetron Injectable 4 milliGRAM(s) IV Push every 8 hours PRN Nausea and/or Vomiting  
10/29: says she feels uncomfortable in bed  admitted with recurrent falls      PHYSICAL EXAM:    Daily Height in cm: 160.02 (29 Oct 2023 02:05)    Daily     Vital Signs Last 24 Hrs  T(C): 36.5 (29 Oct 2023 07:40), Max: 36.7 (29 Oct 2023 06:03)  T(F): 97.7 (29 Oct 2023 07:40), Max: 98 (29 Oct 2023 06:03)  HR: 78 (29 Oct 2023 07:40) (68 - 78)  BP: 169/80 (29 Oct 2023 07:40) (149/63 - 169/80)  BP(mean): 89 (29 Oct 2023 06:03) (89 - 89)  RR: 18 (29 Oct 2023 07:40) (18 - 18)  SpO2: 96% (29 Oct 2023 07:40) (95% - 100%)    Constitutional: Weak and ill appearing  HEENT: Atraumatic, RAKESH,   Respiratory: Breath Sounds normal, no rhonchi/wheeze  Cardiovascular: N S1S2;   Gastrointestinal: Abdomen soft, non tender, Bowel Sounds present  Extremities: No edema, peripheral pulses present  Neurological: AAO x 3, no gross focal motor deficits  Skin: Non cellulitic, no rash, ulcers  Lymph Nodes: No lymphadenopathy noted  Back: No CVA tenderness   Musculoskeletal: non tender  Breasts: Deferred  Genitourinary: deferred  Rectal: Deferred    All Labs/EKG/Radiology/Meds reviewed by me                          11.8   7.60  )-----------( 302      ( 29 Oct 2023 10:41 )             36.2       CBC Full  -  ( 29 Oct 2023 10:41 )  WBC Count : 7.60 K/uL  RBC Count : 3.84 M/uL  Hemoglobin : 11.8 g/dL  Hematocrit : 36.2 %  Platelet Count - Automated : 302 K/uL  Mean Cell Volume : 94.3 fl  Mean Cell Hemoglobin : 30.7 pg  Mean Cell Hemoglobin Concentration : 32.6 gm/dL  Auto Neutrophil # : x  Auto Lymphocyte # : x  Auto Monocyte # : x  Auto Eosinophil # : x  Auto Basophil # : x  Auto Neutrophil % : x  Auto Lymphocyte % : x  Auto Monocyte % : x  Auto Eosinophil % : x  Auto Basophil % : x      10-29    142  |  108  |  17  ----------------------------<  89  3.6   |  28  |  0.74    Ca    9.4      29 Oct 2023 10:41    TPro  6.3  /  Alb  3.1<L>  /  TBili  0.6  /  DBili  x   /  AST  15  /  ALT  <6<L>  /  AlkPhos  88  10-29      LIVER FUNCTIONS - ( 29 Oct 2023 10:41 )  Alb: 3.1 g/dL / Pro: 6.3 gm/dL / ALK PHOS: 88 U/L / ALT: <6 U/L / AST: 15 U/L / GGT: x               Urinalysis Basic - ( 29 Oct 2023 10:41 )    Color: x / Appearance: x / SG: x / pH: x  Gluc: 89 mg/dL / Ketone: x  / Bili: x / Urobili: x   Blood: x / Protein: x / Nitrite: x   Leuk Esterase: x / RBC: x / WBC x   Sq Epi: x / Non Sq Epi: x / Bacteria: x      < from: Xray Chest 1 View- PORTABLE-Urgent (10.29.23 @ 05:18) >  IMPRESSION: No gross consolidation is seen.    < end of copied text >  < from: CT Cervical Spine No Cont (10.29.23 @ 02:41) >  No acute cervical spine fracture or evidence of traumatic malalignment.   Mild cervical spondylosis.    < end of copied text >  < from: CT Head No Cont (10.29.23 @ 02:40) >  No acute intracranial hemorrhage, mass effect, or acute osseous fracture.    < end of copied text >    EKG: NSR    MEDICATIONS  (STANDING):  atorvastatin 10 milliGRAM(s) Oral at bedtime  carbidopa/levodopa  25/100 1 Tablet(s) Oral daily  heparin   Injectable 5000 Unit(s) SubCutaneous every 12 hours  lisinopril 20 milliGRAM(s) Oral daily  metoprolol succinate ER 50 milliGRAM(s) Oral daily  multivitamin 1 Tablet(s) Oral daily    MEDICATIONS  (PRN):  acetaminophen     Tablet .. 650 milliGRAM(s) Oral every 6 hours PRN Temp greater or equal to 38C (100.4F), Mild Pain (1 - 3)  aluminum hydroxide/magnesium hydroxide/simethicone Suspension 30 milliLiter(s) Oral every 4 hours PRN Dyspepsia  melatonin 3 milliGRAM(s) Oral at bedtime PRN Insomnia  ondansetron Injectable 4 milliGRAM(s) IV Push every 8 hours PRN Nausea and/or Vomiting    
10/29: says she feels uncomfortable in bed  admitted with recurrent falls    10/30: no complaints    PHYSICAL EXAM:    Daily Height in cm: 160.02 (29 Oct 2023 02:05)    Daily     Vital Signs Last 24 Hrs  T(C): 36.9 (30 Oct 2023 15:46), Max: 37.1 (29 Oct 2023 21:45)  T(F): 98.4 (30 Oct 2023 15:46), Max: 98.8 (29 Oct 2023 21:45)  HR: 81 (30 Oct 2023 15:46) (78 - 81)  BP: 136/58 (30 Oct 2023 15:46) (128/50 - 136/65)  BP(mean): --  RR: 18 (30 Oct 2023 15:46) (18 - 18)  SpO2: 98% (30 Oct 2023 15:46) (97% - 99%)    Parameters below as of 30 Oct 2023 15:46  Patient On (Oxygen Delivery Method): room air        Constitutional: Weak and ill appearing  HEENT: Atraumatic, RAKESH,   Respiratory: Breath Sounds normal, no rhonchi/wheeze  Cardiovascular: N S1S2;   Gastrointestinal: Abdomen soft, non tender, Bowel Sounds present  Extremities: No edema, peripheral pulses present  Neurological: AAO x 3, no gross focal motor deficits  Skin: Non cellulitic, no rash, ulcers  Lymph Nodes: No lymphadenopathy noted  Back: No CVA tenderness   Musculoskeletal: non tender  Breasts: Deferred  Genitourinary: deferred  Rectal: Deferred    All Labs/EKG/Radiology/Meds reviewed by me                          11.8   7.60  )-----------( 302      ( 29 Oct 2023 10:41 )             36.2       CBC Full  -  ( 29 Oct 2023 10:41 )  WBC Count : 7.60 K/uL  RBC Count : 3.84 M/uL  Hemoglobin : 11.8 g/dL  Hematocrit : 36.2 %  Platelet Count - Automated : 302 K/uL  Mean Cell Volume : 94.3 fl  Mean Cell Hemoglobin : 30.7 pg  Mean Cell Hemoglobin Concentration : 32.6 gm/dL  Auto Neutrophil # : x  Auto Lymphocyte # : x  Auto Monocyte # : x  Auto Eosinophil # : x  Auto Basophil # : x  Auto Neutrophil % : x  Auto Lymphocyte % : x  Auto Monocyte % : x  Auto Eosinophil % : x  Auto Basophil % : x      10-29    142  |  108  |  17  ----------------------------<  89  3.6   |  28  |  0.74    Ca    9.4      29 Oct 2023 10:41    TPro  6.3  /  Alb  3.1<L>  /  TBili  0.6  /  DBili  x   /  AST  15  /  ALT  <6<L>  /  AlkPhos  88  10-29      LIVER FUNCTIONS - ( 29 Oct 2023 10:41 )  Alb: 3.1 g/dL / Pro: 6.3 gm/dL / ALK PHOS: 88 U/L / ALT: <6 U/L / AST: 15 U/L / GGT: x               Urinalysis Basic - ( 29 Oct 2023 10:41 )    Color: x / Appearance: x / SG: x / pH: x  Gluc: 89 mg/dL / Ketone: x  / Bili: x / Urobili: x   Blood: x / Protein: x / Nitrite: x   Leuk Esterase: x / RBC: x / WBC x   Sq Epi: x / Non Sq Epi: x / Bacteria: x      < from: Xray Chest 1 View- PORTABLE-Urgent (10.29.23 @ 05:18) >  IMPRESSION: No gross consolidation is seen.    < end of copied text >  < from: CT Cervical Spine No Cont (10.29.23 @ 02:41) >  No acute cervical spine fracture or evidence of traumatic malalignment.   Mild cervical spondylosis.    < end of copied text >  < from: CT Head No Cont (10.29.23 @ 02:40) >  No acute intracranial hemorrhage, mass effect, or acute osseous fracture.    < end of copied text >    EKG: NSR    MEDICATIONS  (STANDING):  atorvastatin 10 milliGRAM(s) Oral at bedtime  carbidopa/levodopa  25/100 1 Tablet(s) Oral <User Schedule>  heparin   Injectable 5000 Unit(s) SubCutaneous every 12 hours  lisinopril 20 milliGRAM(s) Oral daily  metoprolol succinate ER 50 milliGRAM(s) Oral daily  multivitamin 1 Tablet(s) Oral daily  polyethylene glycol 3350 17 Gram(s) Oral daily  senna 2 Tablet(s) Oral at bedtime    MEDICATIONS  (PRN):  acetaminophen     Tablet .. 650 milliGRAM(s) Oral every 6 hours PRN Temp greater or equal to 38C (100.4F), Mild Pain (1 - 3)  aluminum hydroxide/magnesium hydroxide/simethicone Suspension 30 milliLiter(s) Oral every 4 hours PRN Dyspepsia  melatonin 3 milliGRAM(s) Oral at bedtime PRN Insomnia  ondansetron Injectable 4 milliGRAM(s) IV Push every 8 hours PRN Nausea and/or Vomiting

## 2023-10-31 NOTE — PROGRESS NOTE ADULT - ASSESSMENT
82-year-old female with history of Parkinson's, HLD, HTN presents to the ED after multiple falls at home, had 2 falls yesterday. Patients physical state has been declining, increase in weakness and Pakinson's symptoms have been worsening. Endorses pain on the back of the head secondary from the fall. CT-Head: No acute intracranial hemorrhage, mass effect, or acute osseous fracture. Labs unremarkable. BP:159/84, HR:68, afebrile, saturating 100% in room air.   Patient is being admitted for PT and placement of possible nursing home due to worsening of her medical condition since she lives alone.     Pt admitted with ::    Problem/Plan - 1:  ·  Problem: Recurrent falls. Lives alone at home.   ·  Plan: Multiple episodes of fall  Fell twice 10/27  - PT eval   - /case management   - Fall precaution  - DVT prophylaxis.    Problem/Plan - 2:  ·  Problem: Hypertension.   ·  Plan: Continue home meds   - Lisinopril 20mg   - Metoprolol succ 50mg   - Statin.    Problem/Plan - 3:  ·  Problem: Parkinsonism.   ·  Plan: Worsening Parkinson's disease   - Cabidopa/levodopa   - Neuro consult.    DVT PPX: heparin s/q
82-year-old female with history of Parkinson's, HLD, HTN presents to the ED after multiple falls at home, had 2 falls yesterday. Patients physical state has been declining, increase in weakness and Pakinson's symptoms have been worsening. Endorses pain on the back of the head secondary from the fall. CT-Head: No acute intracranial hemorrhage, mass effect, or acute osseous fracture. Labs unremarkable. BP:159/84, HR:68, afebrile, saturating 100% in room air.   Patient is being admitted for PT and placement of possible nursing home due to worsening of her medical condition since she lives alone.     Pt admitted with ::    Problem/Plan - 1:  ·  Problem: Recurrent falls. Lives alone at home.   ·  Plan: Multiple episodes of fall  Fell twice 10/27  - PT eval   - /case management   - Fall precaution  - DVT prophylaxis.    Problem/Plan - 2:  ·  Problem: Hypertension.   ·  Plan: Continue home meds   - Lisinopril 20mg   - Metoprolol succ 50mg   - Statin.    Problem/Plan - 3:  ·  Problem: Parkinsonism.   ·  Plan: Worsening Parkinson's disease   - Cabidopa/levodopa increased to bid  - Neuro consult.    DVT PPX: heparin s/q    DISPO: safe d/c planning underway
82-year-old female with history of Parkinson's, HLD, HTN presents to the ED after multiple falls at home, had 2 falls yesterday. Patients physical state has been declining, increase in weakness and Pakinson's symptoms have been worsening. Endorses pain on the back of the head secondary from the fall. CT-Head: No acute intracranial hemorrhage, mass effect, or acute osseous fracture. Labs unremarkable. BP:159/84, HR:68, afebrile, saturating 100% in room air.   Patient is being admitted for PT and placement of possible nursing home due to worsening of her medical condition since she lives alone.     Pt admitted with ::    Problem/Plan - 1:  ·  Problem: Recurrent falls. Lives alone at home.   ·  Plan: Multiple episodes of fall  Fell twice 10/27  - PT eval   - /case management consults  - Fall precaution  - DVT prophylaxis.    Problem/Plan - 2:  ·  Problem: Hypertension. BP now borderline.   hold BP meds; iv fluids    Problem/Plan - 3:  ·  Problem: Parkinsonism.   ·  Plan: Worsening Parkinson's disease   - Cabidopa/levodopa increased to bid per neuro  - Neuro consult appreciated    DVT PPX: heparin s/q    DISPO: safe d/c planning underway; would need ROM/SNF

## 2023-11-01 VITALS
TEMPERATURE: 98 F | OXYGEN SATURATION: 94 % | SYSTOLIC BLOOD PRESSURE: 115 MMHG | DIASTOLIC BLOOD PRESSURE: 44 MMHG | RESPIRATION RATE: 17 BRPM | HEART RATE: 95 BPM

## 2023-11-01 PROCEDURE — 99239 HOSP IP/OBS DSCHRG MGMT >30: CPT

## 2023-11-01 RX ORDER — POLYETHYLENE GLYCOL 3350 17 G/17G
17 POWDER, FOR SOLUTION ORAL
Qty: 0 | Refills: 0 | DISCHARGE
Start: 2023-11-01

## 2023-11-01 RX ORDER — SENNA PLUS 8.6 MG/1
2 TABLET ORAL
Qty: 0 | Refills: 0 | DISCHARGE
Start: 2023-11-01

## 2023-11-01 RX ORDER — LISINOPRIL 2.5 MG/1
1 TABLET ORAL
Refills: 0 | DISCHARGE

## 2023-11-01 RX ADMIN — HEPARIN SODIUM 5000 UNIT(S): 5000 INJECTION INTRAVENOUS; SUBCUTANEOUS at 09:23

## 2023-11-01 RX ADMIN — SODIUM CHLORIDE 100 MILLILITER(S): 9 INJECTION, SOLUTION INTRAVENOUS at 00:17

## 2023-11-01 RX ADMIN — POLYETHYLENE GLYCOL 3350 17 GRAM(S): 17 POWDER, FOR SOLUTION ORAL at 09:21

## 2023-11-01 RX ADMIN — CARBIDOPA AND LEVODOPA 1 TABLET(S): 25; 100 TABLET ORAL at 09:21

## 2023-11-01 RX ADMIN — Medication 1 TABLET(S): at 09:22

## 2023-11-01 RX ADMIN — LISINOPRIL 20 MILLIGRAM(S): 2.5 TABLET ORAL at 09:22

## 2023-11-01 RX ADMIN — Medication 50 MILLIGRAM(S): at 09:22

## 2023-11-01 NOTE — DISCHARGE NOTE NURSING/CASE MANAGEMENT/SOCIAL WORK - NSDCPEFALRISK_GEN_ALL_CORE
For information on Fall & Injury Prevention, visit: [email or web address] OR  [email or web address] OR  https://www.cdc.gov/steadi/patient.html

## 2023-11-01 NOTE — DISCHARGE NOTE NURSING/CASE MANAGEMENT/SOCIAL WORK - PATIENT PORTAL LINK FT
You can access the FollowMyHealth Patient Portal offered by [facility] by registering at the following website: [email or web address]. By joining [employer]’s FollowMyHealth portal, you will also be able to view your health information using other applications (apps) compatible with our system.

## 2023-11-01 NOTE — DISCHARGE NOTE PROVIDER - NSDCCPCAREPLAN_GEN_ALL_CORE_FT
PRINCIPAL DISCHARGE DIAGNOSIS  Diagnosis: Weakness  Assessment and Plan of Treatment: with falls: Reason Multifactorial due to debility, parkinsons  -physical therapy, rehab  -resume home meds  -hold blood pressure meds due to borderline low bp  -bowel regimen for constipation

## 2023-11-01 NOTE — DISCHARGE NOTE PROVIDER - DETAILS OF MALNUTRITION DIAGNOSIS/DIAGNOSES
This patient has been assessed with a concern for Malnutrition and was treated during this hospitalization for the following Nutrition diagnosis/diagnoses:     -  10/30/2023: Severe protein-calorie malnutrition   -  10/30/2023: Underweight (BMI < 19)

## 2023-11-01 NOTE — DISCHARGE NOTE PROVIDER - NSDCMRMEDTOKEN_GEN_ALL_CORE_FT
aspirin 81 mg oral tablet: 81 milligram(s) orally once a day  atorvastatin 10 mg oral tablet: 1 tab(s) orally once a day (at bedtime)  buPROPion 300 mg/24 hours (XL) oral tablet, extended release: 1 tab(s) orally once a day  carbidopa-levodopa 25 mg-100 mg oral tablet: 1 tab(s) orally once a day  clopidogrel 75 mg oral tablet: 1 tab(s) orally once a day  Multiple Vitamins oral tablet: 1 tab(s) orally once a day  polyethylene glycol 3350 oral powder for reconstitution: 17 gram(s) orally once a day  senna leaf extract oral tablet: 2 tab(s) orally once a day (at bedtime)

## 2023-11-01 NOTE — DISCHARGE NOTE PROVIDER - HOSPITAL COURSE
Reason for Admission: Recurrent Fall  History of Present Illness:   82-year-old female with history of Parkinson's, HLD, HTN presents to the ED after multiple falls at home, had 2 falls yesterday. Patients physical state has been declining, increase in weakness and Pakinson's symptoms have been worsening. Endorses pain on the back of the head secondary from the fall. CT-Head: No acute intracranial hemorrhage, mass effect, or acute osseous fracture. Labs unremarkable. BP:159/84, HR:68, afebrile, saturating 100% in room air.   Patient is being admitted for PT and placement of possible nursing home due to worsening of her medical condition since she lives alone.     Hospital course:  Pt presents s/p fall, lives home alone.  Pt had PT, recommends [facility].  Hospital course complicated by borderline low BP, given IVFs, and BP meds subsequently stopped.  Pt also with constipation, received miralax, enema, and was manual disimpacted.  Pt otherwise comfortable, HD stable and ready for d/c to [facility].    REVIEW OF SYSTEMS: All other review of systems is negative unless indicated above.    Vital Signs Last 24 Hrs  T(C): 36.9 (01 Nov 2023 07:20), Max: 36.9 (01 Nov 2023 00:45)  T(F): 98.5 (01 Nov 2023 07:20), Max: 98.5 (01 Nov 2023 07:20)  HR: 95 (01 Nov 2023 07:20) (90 - 95)  BP: 115/44 (01 Nov 2023 07:20) (93/38 - 115/44)  BP(mean): --  RR: 17 (01 Nov 2023 07:20) (17 - 18)  SpO2: 94% (01 Nov 2023 07:20) (94% - 97%)    Parameters below as of 01 Nov 2023 07:20  Patient On (Oxygen Delivery Method): room air      PHYSICAL EXAM:    Constitutional: NAD, awake and alert, elderly, frail, thin appearing  HEENT: PERR, EOMI, Normal Hearing, MMM  Neck: Soft and supple  Respiratory: Breath sounds are clear bilaterally, No wheezing, rales or rhonchi  Cardiovascular: S1 and S2, regular rate and rhythm, no Murmurs, gallops or rubs  Gastrointestinal: Bowel Sounds present, soft, nontender, nondistended, no guarding, no rebound  Extremities: No peripheral edema  Neurological: A/O x 3, no focal deficits in my limited exam      med/labs: Reviewed and interpreted       Assessment and Plan:  82-year-old female with history of Parkinson's, HLD, HTN presents to the ED after multiple falls at home, had 2 falls yesterday.       Problem/Plan - 1:  ·  Problem: Recurrent falls. Lives alone at home.   ·  Plan: Multiple episodes of fall  Fell twice 10/27  - PT: rec [facility]  - Fall precaution    Problem/Plan - 2:  ·  Problem: Hypertension. BP now borderline low: stop bp meds, s/p IVF hydration.    Problem/Plan - 3:  ·  Problem: Parkinsonism.   ·  Plan: Worsening Parkinson's disease   - Cabidopa/levodopa increased to bid per neuro  - Neuro consult appreciated    Constipation:  -s/p enema  -s/p disimpaction  -c/w aggressive bowel regimen      DISPO: d/c [facility]    Attending Statement: 40 minutes spent on total encounter and discharge planning.

## 2023-11-09 DIAGNOSIS — Z71.3 DIETARY COUNSELING AND SURVEILLANCE: ICD-10-CM

## 2023-11-09 DIAGNOSIS — R29.6 REPEATED FALLS: ICD-10-CM

## 2023-11-09 DIAGNOSIS — Z79.82 LONG TERM (CURRENT) USE OF ASPIRIN: ICD-10-CM

## 2023-11-09 DIAGNOSIS — K59.00 CONSTIPATION, UNSPECIFIED: ICD-10-CM

## 2023-11-09 DIAGNOSIS — E43 UNSPECIFIED SEVERE PROTEIN-CALORIE MALNUTRITION: ICD-10-CM

## 2023-11-09 DIAGNOSIS — G20.A1 PARKINSON'S DISEASE WITHOUT DYSKINESIA, WITHOUT MENTION OF FLUCTUATIONS: ICD-10-CM

## 2023-11-09 DIAGNOSIS — E78.5 HYPERLIPIDEMIA, UNSPECIFIED: ICD-10-CM

## 2023-11-09 DIAGNOSIS — I10 ESSENTIAL (PRIMARY) HYPERTENSION: ICD-10-CM

## 2024-05-14 ENCOUNTER — RX ONLY (RX ONLY)
Age: 84
End: 2024-05-14

## 2024-05-14 ENCOUNTER — OFFICE (OUTPATIENT)
Dept: URBAN - METROPOLITAN AREA CLINIC 102 | Facility: CLINIC | Age: 84
Setting detail: OPHTHALMOLOGY
End: 2024-05-14
Payer: MEDICARE

## 2024-05-14 DIAGNOSIS — H35.3112: ICD-10-CM

## 2024-05-14 PROBLEM — H35.361 DRUSEN; RIGHT EYE: Status: ACTIVE | Noted: 2024-05-14

## 2024-05-14 PROBLEM — H52.7 REFRACTIVE ERROR: Status: ACTIVE | Noted: 2024-05-14

## 2024-05-14 PROBLEM — H35.3221 AGE RELATED MACULAR DEGENERATION WET; LEFT EYE ACTIVE NEOVASCULARIZATION: Status: ACTIVE | Noted: 2024-05-14

## 2024-05-14 PROBLEM — H25.13 CATARACT SENILE NUCLEAR SCLEROSIS; BOTH EYES: Status: ACTIVE | Noted: 2024-05-14

## 2024-05-14 PROCEDURE — 92134 CPTRZ OPH DX IMG PST SGM RTA: CPT | Performed by: OPHTHALMOLOGY

## 2024-05-14 PROCEDURE — 99204 OFFICE O/P NEW MOD 45 MIN: CPT | Performed by: OPHTHALMOLOGY

## 2024-05-14 ASSESSMENT — CONFRONTATIONAL VISUAL FIELD TEST (CVF)
OD_FINDINGS: FULL
OS_FINDINGS: FULL

## 2024-10-21 NOTE — ED ADULT NURSE NOTE - PRIMARY CARE PROVIDER
Unknown Detail Level: Detailed Quality 130: Documentation Of Current Medications In The Medical Record: Current Medications Documented Quality 431: Preventive Care And Screening: Unhealthy Alcohol Use - Screening: Patient not identified as an unhealthy alcohol user when screened for unhealthy alcohol use using a systematic screening method Quality 226: Preventive Care And Screening: Tobacco Use: Screening And Cessation Intervention: Patient screened for tobacco use and is an ex/non-smoker
